# Patient Record
Sex: MALE | Race: OTHER | Employment: OTHER | URBAN - METROPOLITAN AREA
[De-identification: names, ages, dates, MRNs, and addresses within clinical notes are randomized per-mention and may not be internally consistent; named-entity substitution may affect disease eponyms.]

---

## 2020-01-15 ENCOUNTER — HOSPITAL ENCOUNTER (OUTPATIENT)
Age: 85
Discharge: HOME | End: 2020-01-15
Payer: MEDICARE

## 2020-01-15 ENCOUNTER — HOSPITAL ENCOUNTER (OUTPATIENT)
Dept: GENERAL RADIOLOGY | Age: 85
Discharge: HOME | End: 2020-01-15
Payer: MEDICARE

## 2020-01-15 ENCOUNTER — OFFICE VISIT (OUTPATIENT)
Dept: INTERNAL MEDICINE CLINIC | Age: 85
End: 2020-01-15

## 2020-01-15 VITALS
HEART RATE: 80 BPM | TEMPERATURE: 97.8 F | WEIGHT: 109 LBS | DIASTOLIC BLOOD PRESSURE: 60 MMHG | HEIGHT: 60 IN | SYSTOLIC BLOOD PRESSURE: 160 MMHG | BODY MASS INDEX: 21.4 KG/M2

## 2020-01-15 DIAGNOSIS — D64.9 ANEMIA, UNSPECIFIED TYPE: ICD-10-CM

## 2020-01-15 DIAGNOSIS — R06.09 DYSPNEA ON EXERTION: ICD-10-CM

## 2020-01-15 DIAGNOSIS — R79.89 ELEVATED SERUM CREATININE: ICD-10-CM

## 2020-01-15 DIAGNOSIS — I10 BENIGN ESSENTIAL HTN: ICD-10-CM

## 2020-01-15 PROBLEM — R80.9 PROTEINURIA: Status: ACTIVE | Noted: 2020-01-15

## 2020-01-15 PROBLEM — L60.2 ONYCHAUXIS: Status: ACTIVE | Noted: 2020-01-15

## 2020-01-15 PROBLEM — M17.0 OSTEOARTHRITIS OF BOTH KNEES: Status: ACTIVE | Noted: 2020-01-15

## 2020-01-15 LAB
A/G RATIO: 1.1 (ref 1.1–2.2)
ALBUMIN SERPL-MCNC: 3.9 G/DL (ref 3.4–5)
ALP BLD-CCNC: 87 U/L (ref 40–129)
ALT SERPL-CCNC: 7 U/L (ref 10–40)
ANION GAP SERPL CALCULATED.3IONS-SCNC: 13 MMOL/L (ref 3–16)
AST SERPL-CCNC: 20 U/L (ref 15–37)
BASOPHILS ABSOLUTE: 0.1 K/UL (ref 0–0.2)
BASOPHILS RELATIVE PERCENT: 0.9 %
BILIRUB SERPL-MCNC: 0.5 MG/DL (ref 0–1)
BILIRUBIN URINE: NEGATIVE
BLOOD, URINE: NEGATIVE
BUN BLDV-MCNC: 14 MG/DL (ref 7–20)
CALCIUM SERPL-MCNC: 9 MG/DL (ref 8.3–10.6)
CHLORIDE BLD-SCNC: 103 MMOL/L (ref 99–110)
CLARITY: CLEAR
CO2: 22 MMOL/L (ref 21–32)
COLOR: ABNORMAL
CREAT SERPL-MCNC: 0.6 MG/DL (ref 0.8–1.3)
EOSINOPHILS ABSOLUTE: 0.1 K/UL (ref 0–0.6)
EOSINOPHILS RELATIVE PERCENT: 1.1 %
EPITHELIAL CELLS, UA: 2 /HPF (ref 0–5)
FERRITIN: 31.2 NG/ML (ref 30–400)
FOLATE: >20 NG/ML (ref 4.78–24.2)
GFR AFRICAN AMERICAN: >60
GFR NON-AFRICAN AMERICAN: >60
GLOBULIN: 3.6 G/DL
GLUCOSE BLD-MCNC: 88 MG/DL (ref 70–99)
GLUCOSE URINE: NEGATIVE MG/DL
HCT VFR BLD CALC: 33.3 % (ref 40.5–52.5)
HEMOGLOBIN: 10.7 G/DL (ref 13.5–17.5)
HYALINE CASTS: 2 /LPF (ref 0–8)
IRON SATURATION: 11 % (ref 20–50)
IRON: 30 UG/DL (ref 59–158)
KETONES, URINE: NEGATIVE MG/DL
LEUKOCYTE ESTERASE, URINE: NEGATIVE
LYMPHOCYTES ABSOLUTE: 2 K/UL (ref 1–5.1)
LYMPHOCYTES RELATIVE PERCENT: 22.7 %
MCH RBC QN AUTO: 31.1 PG (ref 26–34)
MCHC RBC AUTO-ENTMCNC: 32.2 G/DL (ref 31–36)
MCV RBC AUTO: 96.9 FL (ref 80–100)
MICROSCOPIC EXAMINATION: YES
MONOCYTES ABSOLUTE: 0.5 K/UL (ref 0–1.3)
MONOCYTES RELATIVE PERCENT: 5.3 %
NEUTROPHILS ABSOLUTE: 6.2 K/UL (ref 1.7–7.7)
NEUTROPHILS RELATIVE PERCENT: 70 %
NITRITE, URINE: NEGATIVE
PDW BLD-RTO: 16.4 % (ref 12.4–15.4)
PH UA: 6 (ref 5–8)
PLATELET # BLD: 346 K/UL (ref 135–450)
PMV BLD AUTO: 9.2 FL (ref 5–10.5)
POTASSIUM SERPL-SCNC: 3.7 MMOL/L (ref 3.5–5.1)
PRO-BNP: 750 PG/ML (ref 0–449)
PROTEIN UA: 30 MG/DL
RBC # BLD: 3.44 M/UL (ref 4.2–5.9)
RBC UA: 3 /HPF (ref 0–4)
SODIUM BLD-SCNC: 138 MMOL/L (ref 136–145)
SPECIFIC GRAVITY UA: 1.02 (ref 1–1.03)
TOTAL IRON BINDING CAPACITY: 261 UG/DL (ref 260–445)
TOTAL PROTEIN: 7.5 G/DL (ref 6.4–8.2)
TSH REFLEX: 1.44 UIU/ML (ref 0.27–4.2)
URINE TYPE: ABNORMAL
UROBILINOGEN, URINE: 1 E.U./DL
VITAMIN B-12: >2000 PG/ML (ref 211–911)
WBC # BLD: 8.8 K/UL (ref 4–11)
WBC UA: 2 /HPF (ref 0–5)

## 2020-01-15 PROCEDURE — 93005 ELECTROCARDIOGRAM TRACING: CPT | Performed by: PHYSICIAN ASSISTANT

## 2020-01-15 PROCEDURE — 99204 OFFICE O/P NEW MOD 45 MIN: CPT | Performed by: PHYSICIAN ASSISTANT

## 2020-01-15 PROCEDURE — 71046 X-RAY EXAM CHEST 2 VIEWS: CPT

## 2020-01-15 RX ORDER — AMLODIPINE BESYLATE 10 MG/1
10 TABLET ORAL DAILY
COMMUNITY
End: 2020-01-15 | Stop reason: SDUPTHER

## 2020-01-15 RX ORDER — AMLODIPINE BESYLATE 10 MG/1
10 TABLET ORAL DAILY
Qty: 30 TABLET | Refills: 2 | Status: SHIPPED | OUTPATIENT
Start: 2020-01-15 | End: 2020-03-10

## 2020-01-15 RX ORDER — LEVOCETIRIZINE DIHYDROCHLORIDE 5 MG/1
5 TABLET, FILM COATED ORAL NIGHTLY
COMMUNITY

## 2020-01-15 RX ORDER — DICLOFENAC POTASSIUM 50 MG/1
50 TABLET, FILM COATED ORAL 2 TIMES DAILY WITH MEALS
COMMUNITY
End: 2020-02-11

## 2020-01-15 ASSESSMENT — ENCOUNTER SYMPTOMS
NAUSEA: 0
ABDOMINAL PAIN: 0
CHEST TIGHTNESS: 0
COUGH: 0
DIARRHEA: 0
TROUBLE SWALLOWING: 0
BLOOD IN STOOL: 0
SORE THROAT: 0
VOMITING: 0
SHORTNESS OF BREATH: 1
WHEEZING: 0
CONSTIPATION: 0

## 2020-01-15 NOTE — PROGRESS NOTES
throughout ) present. No wheezing, rhonchi or rales. Abdominal:      General: Abdomen is flat. Bowel sounds are normal. There is no distension. Palpations: Abdomen is soft. There is no hepatomegaly, splenomegaly or mass. Tenderness: There is no tenderness. Hernia: No hernia is present. Comments: Old midline scar noted    Musculoskeletal: Normal range of motion. Left lower leg: No edema. Feet:      Comments: Thick toenails, some dried blood noted around toenails on left foot, he cut them himself last evening. No wounds. Lymphadenopathy:      Cervical: No cervical adenopathy. Skin:     General: Skin is warm and dry. Neurological:      Mental Status: He is alert and oriented to person, place, and time. Psychiatric:         Attention and Perception: Attention normal.         Mood and Affect: Mood normal.         Behavior: Behavior is cooperative. Labs from 10/2019:  WBC 14  Hb 11.5  MCV 92   Crt 1.34  GFR 47  UA 2+ PRO      HDL 25  LDL 60  A1c 5.3%  TSH 1.13  PSA 1.3  Vt D 25.5  Uric acid 6.3      Assessment/Plan     1. Encounter to establish care    2. Benign essential HTN  - refill norvasc 10 mg daily. RTO 1 month   - COMPREHENSIVE METABOLIC PANEL; Future    3. Osteoarthritis of both knees, unspecified osteoarthritis type  - can cont diclofenac 50 mg BID with meals for now     4. Anemia, unspecified type  - unclear chronicity, denies bleeding   - CBC WITH AUTO DIFFERENTIAL; Future  - IRON AND TIBC; Future  - FERRITIN; Future  - VITAMIN B12 & FOLATE; Future    5. Proteinuria, unspecified type  - URINALYSIS WITH MICROSCOPIC    6. Dyspnea on exertion  - CBC WITH AUTO DIFFERENTIAL; Future  - TSH with Reflex; Future  - XR CHEST STANDARD (2 VW); Future  - EKG 12 Lead; Future  - BRAIN NATRIURETIC PEPTIDE (BNP); Future    7. Elevated serum creatinine  - unclear chronicity   - COMPREHENSIVE METABOLIC PANEL; Future    8.  Onychauxis   - should see podiatry for foot nail care, he has cut them himself and caused bleeding. No wounds today     Further w/u pending results of above  RTO 1 month     William Cain PA-C   1/15/2020      Addendum:    Discussed results with dtr, Chrissne Geges. Her father's dyspnea is much better without intervention.   -  Discussed CXR showing changes consistent with pulmonary fibrosis and my recommendation for high resolution CT chest, Matt Davey will discuss with her dad but would like to hold off for now as his symptoms are much better. - Iron def anemia- resume PO iron, stop diclofenac, repeat CBC 1 month. - High Vit B12- stop oral B12 supplementation.       Has appt scheduled next month with Dr. David Weldon for f/u     William Cain PA-C  1/20/2020 12:42 PM

## 2020-01-16 LAB
EKG ATRIAL RATE: 70 BPM
EKG DIAGNOSIS: NORMAL
EKG P AXIS: 41 DEGREES
EKG P-R INTERVAL: 168 MS
EKG Q-T INTERVAL: 420 MS
EKG QRS DURATION: 136 MS
EKG QTC CALCULATION (BAZETT): 453 MS
EKG R AXIS: -25 DEGREES
EKG T AXIS: 14 DEGREES
EKG VENTRICULAR RATE: 70 BPM

## 2020-01-16 PROCEDURE — 93010 ELECTROCARDIOGRAM REPORT: CPT | Performed by: INTERNAL MEDICINE

## 2020-01-17 PROBLEM — R79.89 ELEVATED SERUM CREATININE: Status: RESOLVED | Noted: 2020-01-15 | Resolved: 2020-01-17

## 2020-01-17 PROBLEM — R93.89 ABNORMAL CXR: Status: ACTIVE | Noted: 2020-01-17

## 2020-01-17 PROBLEM — D50.9 IRON DEFICIENCY ANEMIA: Status: ACTIVE | Noted: 2020-01-15

## 2020-02-11 ENCOUNTER — OFFICE VISIT (OUTPATIENT)
Dept: INTERNAL MEDICINE CLINIC | Age: 85
End: 2020-02-11

## 2020-02-11 VITALS
SYSTOLIC BLOOD PRESSURE: 146 MMHG | DIASTOLIC BLOOD PRESSURE: 40 MMHG | BODY MASS INDEX: 21.4 KG/M2 | WEIGHT: 109 LBS | HEIGHT: 60 IN | HEART RATE: 76 BPM

## 2020-02-11 DIAGNOSIS — D50.9 IRON DEFICIENCY ANEMIA, UNSPECIFIED IRON DEFICIENCY ANEMIA TYPE: ICD-10-CM

## 2020-02-11 LAB
BASOPHILS ABSOLUTE: 0.1 K/UL (ref 0–0.2)
BASOPHILS RELATIVE PERCENT: 0.7 %
EOSINOPHILS ABSOLUTE: 0.2 K/UL (ref 0–0.6)
EOSINOPHILS RELATIVE PERCENT: 2.5 %
HCT VFR BLD CALC: 35.7 % (ref 40.5–52.5)
HEMOGLOBIN: 11.6 G/DL (ref 13.5–17.5)
LYMPHOCYTES ABSOLUTE: 2 K/UL (ref 1–5.1)
LYMPHOCYTES RELATIVE PERCENT: 27.5 %
MCH RBC QN AUTO: 30.4 PG (ref 26–34)
MCHC RBC AUTO-ENTMCNC: 32.5 G/DL (ref 31–36)
MCV RBC AUTO: 93.6 FL (ref 80–100)
MONOCYTES ABSOLUTE: 0.3 K/UL (ref 0–1.3)
MONOCYTES RELATIVE PERCENT: 3.9 %
NEUTROPHILS ABSOLUTE: 4.6 K/UL (ref 1.7–7.7)
NEUTROPHILS RELATIVE PERCENT: 65.4 %
PDW BLD-RTO: 16 % (ref 12.4–15.4)
PLATELET # BLD: 413 K/UL (ref 135–450)
PMV BLD AUTO: 8.9 FL (ref 5–10.5)
RBC # BLD: 3.81 M/UL (ref 4.2–5.9)
WBC # BLD: 7.1 K/UL (ref 4–11)

## 2020-02-11 PROCEDURE — G8484 FLU IMMUNIZE NO ADMIN: HCPCS | Performed by: INTERNAL MEDICINE

## 2020-02-11 PROCEDURE — 4040F PNEUMOC VAC/ADMIN/RCVD: CPT | Performed by: INTERNAL MEDICINE

## 2020-02-11 PROCEDURE — 1123F ACP DISCUSS/DSCN MKR DOCD: CPT | Performed by: INTERNAL MEDICINE

## 2020-02-11 PROCEDURE — G8427 DOCREV CUR MEDS BY ELIG CLIN: HCPCS | Performed by: INTERNAL MEDICINE

## 2020-02-11 PROCEDURE — 99214 OFFICE O/P EST MOD 30 MIN: CPT | Performed by: INTERNAL MEDICINE

## 2020-02-11 PROCEDURE — 1036F TOBACCO NON-USER: CPT | Performed by: INTERNAL MEDICINE

## 2020-02-11 PROCEDURE — G8420 CALC BMI NORM PARAMETERS: HCPCS | Performed by: INTERNAL MEDICINE

## 2020-02-11 RX ORDER — TRAMADOL HYDROCHLORIDE 50 MG/1
50 TABLET ORAL EVERY 12 HOURS PRN
Qty: 60 TABLET | Refills: 0 | Status: SHIPPED | OUTPATIENT
Start: 2020-02-11 | End: 2020-03-12

## 2020-02-11 ASSESSMENT — ENCOUNTER SYMPTOMS
DIARRHEA: 0
WHEEZING: 0
ABDOMINAL PAIN: 0
VOMITING: 0
SHORTNESS OF BREATH: 1
NAUSEA: 0
BLOOD IN STOOL: 0
COUGH: 0
CHEST TIGHTNESS: 0

## 2020-02-11 NOTE — PROGRESS NOTES
Subjective:      Patient ID: Garry Mustafa is a 80 y.o. male. HPI  Is here for follow up of HTN, anemia and OA    Patient's BP is  controlled on current medications. He stopped taking NSAID as he was found to be anemic,he has been having in the knee joints. Takes iron pills for anemia. She is not keen on him getting a colonoscopy       Review of Systems   Constitutional: Negative. Negative for fatigue and fever. Respiratory: Positive for shortness of breath (with exertion). Negative for cough, chest tightness and wheezing. Cardiovascular: Negative for chest pain and palpitations. Gastrointestinal: Negative for abdominal pain, blood in stool, diarrhea, nausea and vomiting. Genitourinary: Negative for hematuria. Neurological: Negative for headaches. Objective:   Physical Exam  Constitutional:       Appearance: He is well-developed. HENT:      Head: Normocephalic and atraumatic. Eyes:      Pupils: Pupils are equal, round, and reactive to light. Neck:      Musculoskeletal: Normal range of motion and neck supple. Thyroid: No thyromegaly. Cardiovascular:      Rate and Rhythm: Normal rate and regular rhythm. Heart sounds: Normal heart sounds. No murmur. No friction rub. No gallop. Comments: No carotid bruit  Pulmonary:      Effort: Pulmonary effort is normal. No respiratory distress. Breath sounds: Normal breath sounds. No wheezing or rales. Chest:      Chest wall: No tenderness. Abdominal:      General: Bowel sounds are normal. There is no distension. Palpations: Abdomen is soft. There is no mass. Tenderness: There is no abdominal tenderness. There is no guarding or rebound. Neurological:      Mental Status: He is alert and oriented to person, place, and time. Assessment:       Diagnosis Orders   1. Benign essential HTN     2. Iron deficiency anemia, unspecified iron deficiency anemia type  CBC Auto Differential   3.  Osteoarthritis of both knees, unspecified osteoarthritis type  traMADol (ULTRAM) 50 MG tablet   4. Pulmonary fibrosis ([address]             Plan:        HTN. Fair control. Continue norvasc. Anemia- likely iron deficiency. Takes iron pills. Interstitial lung disease per CXR  Order high res lung CT  He does have SOB with exertion    OA  Avoid NSAIDS given anemia. Tylenol not helping.   Tramadol bid prn          Tracy Pedraza MD

## 2020-02-15 ENCOUNTER — HOSPITAL ENCOUNTER (OUTPATIENT)
Dept: CT IMAGING | Age: 85
Discharge: HOME | End: 2020-02-15
Payer: MEDICARE

## 2020-02-15 PROCEDURE — 71250 CT THORAX DX C-: CPT

## 2020-02-25 ENCOUNTER — TELEPHONE (OUTPATIENT)
Dept: PULMONOLOGY | Age: 85
End: 2020-02-25

## 2020-02-25 ENCOUNTER — TELEPHONE (OUTPATIENT)
Dept: INTERNAL MEDICINE CLINIC | Age: 85
End: 2020-02-25

## 2020-02-25 NOTE — TELEPHONE ENCOUNTER
----- Message from Roverto Mikie Shar sent at 2/25/2020 12:37 PM EST -----  Contact: pt dtr- [phone]  His dtr nereyda called- said he was referred to a pulmonologist- called for an appt. ,but they need a referral sent over- did not know the dr.they were referred to even though she called them?- had no information -said they would not set up an appt until they had the referral - last appt -2-11-20-please let her know at [phone]

## 2020-02-25 NOTE — TELEPHONE ENCOUNTER
----- Message from Robert Simonebhavesh Myles sent at 2/25/2020 12:37 PM EST -----  Contact: pt dtr- [phone]  His dtr nereyda called- said he was referred to a pulmonologist- called for an appt. ,but they need a referral sent over- did not know the dr.they were referred to even though she called them?- had no information -said they would not set up an appt until they had the referral - last appt -2-11-20-please let her know at [phone]

## 2020-02-27 ENCOUNTER — TELEPHONE (OUTPATIENT)
Dept: PULMONOLOGY | Age: 85
End: 2020-02-27

## 2020-02-27 NOTE — TELEPHONE ENCOUNTER
Patient did not show for new patient abnormal CT appointment referred by  with  on 2/27/20    Same Day Cancellation: No    Patient rescheduled:  No    New appointment: n/a    Patient was also no show on: n/a

## 2020-03-05 ENCOUNTER — TELEPHONE (OUTPATIENT)
Dept: PHARMACY | Age: 85
End: 2020-03-05

## 2020-03-09 ENCOUNTER — HOSPITAL ENCOUNTER (OUTPATIENT)
Age: 85
Discharge: HOME | End: 2020-03-09
Payer: MEDICAID

## 2020-03-09 ENCOUNTER — OFFICE VISIT (OUTPATIENT)
Dept: PULMONOLOGY | Age: 85
End: 2020-03-09
Payer: MEDICAID

## 2020-03-09 VITALS
DIASTOLIC BLOOD PRESSURE: 60 MMHG | OXYGEN SATURATION: 95 % | HEART RATE: 71 BPM | WEIGHT: 127 LBS | BODY MASS INDEX: 21.16 KG/M2 | RESPIRATION RATE: 19 BRPM | HEIGHT: 65 IN | SYSTOLIC BLOOD PRESSURE: 138 MMHG

## 2020-03-09 PROCEDURE — 86235 NUCLEAR ANTIGEN ANTIBODY: CPT

## 2020-03-09 PROCEDURE — 86038 ANTINUCLEAR ANTIBODIES: CPT

## 2020-03-09 PROCEDURE — 82085 ASSAY OF ALDOLASE: CPT

## 2020-03-09 PROCEDURE — 36415 COLL VENOUS BLD VENIPUNCTURE: CPT

## 2020-03-09 PROCEDURE — 99204 OFFICE O/P NEW MOD 45 MIN: CPT | Performed by: INTERNAL MEDICINE

## 2020-03-09 PROCEDURE — 86431 RHEUMATOID FACTOR QUANT: CPT

## 2020-03-09 PROCEDURE — G8484 FLU IMMUNIZE NO ADMIN: HCPCS | Performed by: INTERNAL MEDICINE

## 2020-03-09 PROCEDURE — G8427 DOCREV CUR MEDS BY ELIG CLIN: HCPCS | Performed by: INTERNAL MEDICINE

## 2020-03-09 PROCEDURE — 82550 ASSAY OF CK (CPK): CPT

## 2020-03-09 PROCEDURE — G8420 CALC BMI NORM PARAMETERS: HCPCS | Performed by: INTERNAL MEDICINE

## 2020-03-09 PROCEDURE — 86200 CCP ANTIBODY: CPT

## 2020-03-09 NOTE — PROGRESS NOTES
are  considered. Within the right upper lobe there is a nodule favoring an area of nodular  scarring measuring 1.5 x 0.7 cm. It should be treated initially as a nodule  and stability demonstrated with follow-up  The heart is enlarged. Coronary artery calcifications are noted. Thoracic  aortic calcifications are noted. Assessment:       · ILD on CT 2/15/2020 . DDx IPF, hypersensitivity pneumonitis, NSIP, CTD-ILD and eosinophilic pneumonia. · RUL 1.5 x 0.7 cm nodule. DDx inflammatory, granuloma, and malignancy. · Dyspnea -secondary to above  · [place] [employer] for 60 years with no respiratory protection  · Lifelong non-smoker      Plan:       · Order for PFTs and 6MW  · Rheumatoid factor, anti-cyclic citrulinated peptide, antinuclear antibody, antisynthetase antibodies, creatine kinase, aldolase, SSA, SSB, Anti SCL70  · Trial of Symbicort 160/4.5 2 puffs BID   · Trial of Albuterol 2 puffs Q4-6 hrs PRN  · We will consider bronchoscopy  · Options of Bx, resection and watchful waiting were discussed with patient.  I recommend radiographic follow up CT chest in may 2020   · Recommend pneumococcal vaccine and yearly influenza vaccine

## 2020-03-10 LAB
ANTI-JO1 IGG: <0.2 AI (ref 0–0.9)
ANTI-NUCLEAR ANTIBODY (ANA): NEGATIVE
ANTI-SCL70 IGG: <0.2 AI (ref 0–0.9)
ANTI-SS-A IGG: <0.2 AI (ref 0–0.9)
ANTI-SS-B IGG: <0.2 AI (ref 0–0.9)
CYCLIC CITRULLINATED PEPTIDE ANTIBODY IGG: 0.5 U/ML (ref 0–2.9)
RHEUMATOID FACTOR: <10 IU/ML
TOTAL CK: 57 U/L (ref 39–308)

## 2020-03-10 RX ORDER — BUDESONIDE AND FORMOTEROL FUMARATE DIHYDRATE 160; 4.5 UG/1; UG/1
2 AEROSOL RESPIRATORY (INHALATION) 2 TIMES DAILY
Qty: 1 INHALER | Refills: 6 | Status: SHIPPED | OUTPATIENT
Start: 2020-03-10 | End: 2021-03-29

## 2020-03-10 RX ORDER — ALBUTEROL SULFATE 90 UG/1
2 AEROSOL, METERED RESPIRATORY (INHALATION) EVERY 6 HOURS PRN
Qty: 1 INHALER | Refills: 6 | Status: SHIPPED | OUTPATIENT
Start: 2020-03-10 | End: 2021-12-01 | Stop reason: SDUPTHER

## 2020-03-10 RX ORDER — AMLODIPINE BESYLATE 10 MG/1
TABLET ORAL
Qty: 90 TABLET | Refills: 0 | Status: SHIPPED | OUTPATIENT
Start: 2020-03-10 | End: 2020-07-10

## 2020-03-11 LAB — ALDOLASE: 3.2 U/L (ref 1.5–8.1)

## 2020-04-15 ENCOUNTER — TELEPHONE (OUTPATIENT)
Dept: PULMONOLOGY | Age: 85
End: 2020-04-15

## 2020-04-15 NOTE — TELEPHONE ENCOUNTER
Patient has cancelled appointment based on the CDC recommended COVID-19 pandemic precautions. Pt is scheduled for 4 week appt on 4/20/2020. appt is cancelled. Patient called with message left for patient to call back to office. 3/9/2020    Assessment:       · ILD on CT 2/15/2020 . DDx IPF, hypersensitivity pneumonitis, NSIP, CTD-ILD and eosinophilic pneumonia. · RUL 1.5 x 0.7 cm nodule. DDx inflammatory, granuloma, and malignancy. · Dyspnea -secondary to above  · [place] for 60 years with no respiratory protection  · Lifelong non-smoker      Plan:       · Order for PFTs and 6MW  · Rheumatoid factor, anti-cyclic citrulinated peptide, antinuclear antibody, antisynthetase antibodies, creatine kinase, aldolase, SSA, SSB, Anti SCL70  · Trial of Symbicort 160/4.5 2 puffs BID   · Trial of Albuterol 2 puffs Q4-6 hrs PRN  · We will consider bronchoscopy  · Options of Bx, resection and watchful waiting were discussed with patient.  I recommend radiographic follow up CT chest in may 2020   · Recommend pneumococcal vaccine and yearly influenza vaccine

## 2020-05-23 ENCOUNTER — HOSPITAL ENCOUNTER (OUTPATIENT)
Dept: CT IMAGING | Age: 85
Discharge: HOME | End: 2020-05-23
Payer: MEDICARE

## 2020-05-23 PROCEDURE — 71250 CT THORAX DX C-: CPT

## 2020-05-27 ENCOUNTER — TELEPHONE (OUTPATIENT)
Dept: PULMONOLOGY | Age: 85
End: 2020-05-27

## 2020-05-27 NOTE — TELEPHONE ENCOUNTER
Patient has cancelled appointment based on the CDC recommended COVID-19 pandemic precautions. Pt was scheduled for 4 week f/u. Attempted to reach pt to change to VV no response. Will continue to call pt to r/s.    3/9/2020    Assessment:       · ILD on CT 2/15/2020 . DDx IPF, hypersensitivity pneumonitis, NSIP, CTD-ILD and eosinophilic pneumonia. · RUL 1.5 x 0.7 cm nodule. DDx inflammatory, granuloma, and malignancy. · Dyspnea -secondary to above  · [place] [employer] for 60 years with no respiratory protection  · Lifelong non-smoker      Plan:       · Order for PFTs and 6MW  · Rheumatoid factor, anti-cyclic citrulinated peptide, antinuclear antibody, antisynthetase antibodies, creatine kinase, aldolase, SSA, SSB, Anti SCL70  · Trial of Symbicort 160/4.5 2 puffs BID   · Trial of Albuterol 2 puffs Q4-6 hrs PRN  · We will consider bronchoscopy  · Options of Bx, resection and watchful waiting were discussed with patient.  I recommend radiographic follow up CT chest in may 2020   · Recommend pneumococcal vaccine and yearly influenza vaccine

## 2020-06-10 ENCOUNTER — OFFICE VISIT (OUTPATIENT)
Dept: PULMONOLOGY | Age: 85
End: 2020-06-10
Payer: MEDICARE

## 2020-06-10 ENCOUNTER — TELEPHONE (OUTPATIENT)
Dept: PULMONOLOGY | Age: 85
End: 2020-06-10

## 2020-06-10 VITALS
OXYGEN SATURATION: 96 % | RESPIRATION RATE: 20 BRPM | SYSTOLIC BLOOD PRESSURE: 132 MMHG | HEIGHT: 63 IN | HEART RATE: 83 BPM | TEMPERATURE: 98 F | BODY MASS INDEX: 22.5 KG/M2 | DIASTOLIC BLOOD PRESSURE: 66 MMHG

## 2020-06-10 PROCEDURE — 99214 OFFICE O/P EST MOD 30 MIN: CPT | Performed by: INTERNAL MEDICINE

## 2020-06-10 PROCEDURE — G0009 ADMIN PNEUMOCOCCAL VACCINE: HCPCS | Performed by: INTERNAL MEDICINE

## 2020-06-10 PROCEDURE — 4040F PNEUMOC VAC/ADMIN/RCVD: CPT | Performed by: INTERNAL MEDICINE

## 2020-06-10 PROCEDURE — 1036F TOBACCO NON-USER: CPT | Performed by: INTERNAL MEDICINE

## 2020-06-10 PROCEDURE — G8427 DOCREV CUR MEDS BY ELIG CLIN: HCPCS | Performed by: INTERNAL MEDICINE

## 2020-06-10 PROCEDURE — 90732 PPSV23 VACC 2 YRS+ SUBQ/IM: CPT | Performed by: INTERNAL MEDICINE

## 2020-06-10 PROCEDURE — 1123F ACP DISCUSS/DSCN MKR DOCD: CPT | Performed by: INTERNAL MEDICINE

## 2020-06-10 PROCEDURE — G8420 CALC BMI NORM PARAMETERS: HCPCS | Performed by: INTERNAL MEDICINE

## 2020-06-10 NOTE — TELEPHONE ENCOUNTER
Pt needs in office appt after ct chest and pft in 9/1/2020. Watch for in office schedule    6/10/2020    Assessment:       · Pulmonary bronchiectasis  · ILD on CT 2/15/2020 . DDx IPF, hypersensitivity pneumonitis, NSIP, CTD-ILD and eosinophilic pneumonia. Unremarkable connective tissue disease work-up with no peripheral eosinophilia. · RUL 1.5 x 0.7 cm nodule on CT 2/15/2020. Stable on repeat CT 5/23/2020. DDx inflammatory, granuloma, and malignancy. · Dyspnea -secondary to above. Much improved with inhaled bronchodilators  · [employer] for 60 years with no respiratory protection  · Lifelong non-smoker      Plan:       · PFTs and 6MW when able given Covid 19 pandemic   · Repeat CT chest in 8/2020  · Continue Symbicort 160/4.5 2 puffs BID   · Continue Albuterol 2 puffs Q4-6 hrs PRN  · Acapella QID to mobilize respiratory secretions.   · Trial of Spiriva Respimat: Two inhalations (5 mcg) once daily (maximum: 2 inhalations per 24 hours  · Pulmonary rehab referral   · Surveillance bronchoscopy with BAL when Covid 19 restriction are lifted    · Pneumococcal vaccine today   · Advised to get influenza vaccine this year

## 2020-06-10 NOTE — PATIENT INSTRUCTIONS
rarely.  Any medication can cause a severe allergic reaction. Such reactions from a vaccine are very rare, estimated at about 1 in a million doses, and would happen within a few minutes to a few hours after the vaccination. As with any medicine, there is a very remote chance of a vaccine causing a serious injury or death. The safety of vaccines is always being monitored. For more information, visit: www.cdc.gov/vaccinesafety/     5. What if there is a serious reaction? What should I look for? Look for anything that concerns you, such as signs of a severe allergic reaction, very high fever, or unusual behavior. Signs of a severe allergic reaction can include hives, swelling of the face and throat, difficulty breathing, a fast heartbeat, dizziness, and weakness. These would usually start a few minutes to a few hours after the vaccination. What should I do? If you think it is a severe allergic reaction or other emergency that cant wait, call 9-1-1 or get to the nearest hospital. Otherwise, call your doctor. Afterward, the reaction should be reported to the Vaccine Adverse Event Reporting System (VAERS). Your doctor might file this report, or you can do it yourself through the VAERS web site at www.vaers. [facility].gov, or by calling 6-375.248.8978. [employer] does not give medical advice. 6. How can I learn more?  Ask your doctor. He or she can give you the vaccine package insert or suggest other sources of information.  Call your local or state health department.    Contact the Centers for Disease Control and Prevention (CDC):  - Call 0-134.990.6331 (1-800-CDC-INFO) or  - Visit CDCs website at [address] 04/24/2015    [facility] and [facility] for Disease Control and Prevention    Office Use Only

## 2020-06-10 NOTE — PROGRESS NOTES
Shortness of Breath, Disp: 1 Inhaler, Rfl: 6    amLODIPine (NORVASC) 10 MG tablet, Take 1 tablet by mouth once daily, Disp: 90 tablet, Rfl: 0    [id number]-Succ Ac (FERREX 28 PO), Take by mouth, Disp: , Rfl:     levocetirizine (XYZAL) 5 MG tablet, Take 5 mg by mouth nightly , Disp: , Rfl:         Objective:   PHYSICAL EXAM:    Blood pressure 132/66, pulse 83, temperature 98 °F (36.7 °C), temperature source Oral, resp. rate 20, height 5' 3\" (1.6 m), SpO2 96 %.' on RA  Gen: No distress. Ill-appearing   Eyes: PERRL. No sclera icterus. No conjunctival injection. ENT: No discharge. Pharynx clear. Neck: Trachea midline. No obvious mass. Resp: No accessory muscle use. Bilateral crackles. Minimal wheezes. No rhonchi. No dullness on percussion. Good air entry. CV: Regular rate. Regular rhythm. No murmur or rub. No edema. GI: Non-tender. Non-distended. No hernia. Skin: Warm and dry. No nodule on exposed extremities. Lymph: No cervical LAD. No supraclavicular LAD. M/S: No cyanosis. No joint deformity. No clubbing. Neuro: Awake. Alert. Moves all four extremities. Psych: Oriented x 3. No anxiety. DATA reviewed by me:   CT chest 2/15/2020  images reviewed by me and showed: Moderate-to-severe chronic lung disease is noted with mixed features. Areas  of paraseptal emphysema are noted as well as areas of end-stage honeycomb  lung. The honeycomb lung is most severe at the lung bases. UIP is  considered although there is not much ground-glass disease present. Sequela  from a remote infectious or inflammatory insult is considered. Connective  tissue related lung disease and chronic hypersensitivity pneumonitis are  considered. Within the right upper lobe there is a nodule favoring an area of nodular  scarring measuring 1.5 x 0.7 cm. It should be treated initially as a nodule  and stability demonstrated with follow-up  The heart is enlarged.   Coronary artery calcifications are

## 2020-07-10 RX ORDER — AMLODIPINE BESYLATE 10 MG/1
TABLET ORAL
Qty: 90 TABLET | Refills: 0 | Status: SHIPPED | OUTPATIENT
Start: 2020-07-10 | End: 2020-10-05

## 2020-08-25 ENCOUNTER — OFFICE VISIT (OUTPATIENT)
Dept: PRIMARY CARE CLINIC | Age: 85
End: 2020-08-25
Payer: MEDICARE

## 2020-08-25 PROCEDURE — 99211 OFF/OP EST MAY X REQ PHY/QHP: CPT | Performed by: NURSE PRACTITIONER

## 2020-08-25 PROCEDURE — G8420 CALC BMI NORM PARAMETERS: HCPCS | Performed by: NURSE PRACTITIONER

## 2020-08-25 PROCEDURE — G8428 CUR MEDS NOT DOCUMENT: HCPCS | Performed by: NURSE PRACTITIONER

## 2020-08-25 NOTE — PROGRESS NOTES
Hardeep River received a viral test for COVID-19. They were educated on isolation and quarantine as appropriate. For any symptoms, they were directed to seek care from their PCP, given contact information to establish with a doctor, directed to an urgent care or the emergency room.

## 2020-08-25 NOTE — TELEPHONE ENCOUNTER
Yes we can do VV with , but will have to do a walk through before hand to make sure that the process works.

## 2020-08-26 LAB — SARS-COV-2, NAA: NOT DETECTED

## 2020-09-01 ENCOUNTER — HOSPITAL ENCOUNTER (OUTPATIENT)
Dept: CT IMAGING | Age: 85
Discharge: HOME | End: 2020-09-01
Payer: MEDICARE

## 2020-09-01 ENCOUNTER — HOSPITAL ENCOUNTER (OUTPATIENT)
Dept: PULMONOLOGY | Age: 85
Discharge: HOME | End: 2020-09-01
Payer: MEDICARE

## 2020-09-01 PROCEDURE — 6370000000 HC RX 637 (ALT 250 FOR IP): Performed by: INTERNAL MEDICINE

## 2020-09-01 PROCEDURE — 71250 CT THORAX DX C-: CPT

## 2020-09-01 RX ORDER — ALBUTEROL SULFATE 90 UG/1
2 AEROSOL, METERED RESPIRATORY (INHALATION) ONCE
Status: DISCONTINUED | OUTPATIENT
Start: 2020-09-01 | End: 2020-09-02 | Stop reason: HOSPADM

## 2020-09-02 NOTE — PROCEDURES
[address]                 [address] [facility] [place], [address]                               PULMONARY FUNCTION    PATIENT NAME: Amor Morales                  :        1930  MED REC NO:   [id number]                          ROOM:  ACCOUNT NO:   [de-identified]                           ADMIT DATE: 2020  PROVIDER:     Steven Zambrano MD    DATE OF PROCEDURE:  2020    REFERRING PROVIDER:  Gladys Awad MD    INDICATION:  ILD. FINDINGS:  1. Spirometry: The FVC is 93% of predicted. The FEV1 is 1.13 liters,  which is 61% of predicted. The FEV1/FVC ratio is 0.48.  2.  The flow volume loop was nondiagnostic. IMPRESSION:  There is a moderate obstructive defect present. This is  consistent with the diagnosis of moderate COPD. It should be noted that  the technician noted that the patient could not accurately follow the  maneuvers and this result should be interpreted with caution. SIX-MINUTE WALK TEST:  A six-minute walk test was conducted per [facility] protocol. The patient walked 480 feet. There was a  significant desaturation from 95% at rest and 1 liter per minute of  oxygen was required to maintain saturations at 88% or greater. The  heart rate at rest was 72 and the heart rate maximum was 97.         Jason Galvan MD    D: 2020 13:20:19       T: 2020 18:43:22     [id number]  Job#: [id number]     Doc#: [id number]    CC:

## 2020-09-09 NOTE — TELEPHONE ENCOUNTER
Per Dr Roberto Gray pt will need to be seen in office with interp line.  Dr Roberto Gray will let me know when to schedule in the next week

## 2020-09-11 ENCOUNTER — OFFICE VISIT (OUTPATIENT)
Dept: INTERNAL MEDICINE CLINIC | Age: 85
End: 2020-09-11

## 2020-09-11 VITALS
SYSTOLIC BLOOD PRESSURE: 148 MMHG | HEIGHT: 63 IN | BODY MASS INDEX: 21.26 KG/M2 | WEIGHT: 120 LBS | HEART RATE: 80 BPM | DIASTOLIC BLOOD PRESSURE: 62 MMHG

## 2020-09-11 DIAGNOSIS — I10 BENIGN ESSENTIAL HTN: ICD-10-CM

## 2020-09-11 DIAGNOSIS — D50.9 IRON DEFICIENCY ANEMIA, UNSPECIFIED IRON DEFICIENCY ANEMIA TYPE: ICD-10-CM

## 2020-09-11 PROCEDURE — 99214 OFFICE O/P EST MOD 30 MIN: CPT | Performed by: INTERNAL MEDICINE

## 2020-09-11 ASSESSMENT — ENCOUNTER SYMPTOMS
COUGH: 0
VOMITING: 0
CHEST TIGHTNESS: 0
SHORTNESS OF BREATH: 1
BLOOD IN STOOL: 0
WHEEZING: 0
DIARRHEA: 0
NAUSEA: 0
ABDOMINAL PAIN: 0

## 2020-09-11 NOTE — PROGRESS NOTES
Subjective:      Patient ID: Harriett Hook is a 80 y.o. male. HPI  Is here for follow up of HTN, anemia and OA     Patient's BP is  controlled on current medications. He stopped taking NSAID as he was found to be anemic,he has been having in the knee joints. Takes iron pills for anemia. patient's family is not keen on him getting a colonoscopy given his age     Review of Systems   Constitutional: Negative. Negative for fatigue and fever. Respiratory: Positive for shortness of breath. Negative for cough, chest tightness and wheezing. Cardiovascular: Negative for chest pain and palpitations. Gastrointestinal: Negative for abdominal pain, blood in stool, diarrhea, nausea and vomiting. Objective:   Physical Exam  Constitutional:       Appearance: He is well-developed. HENT:      Head: Normocephalic and atraumatic. Eyes:      Pupils: Pupils are equal, round, and reactive to light. Neck:      Musculoskeletal: Normal range of motion and neck supple. Thyroid: No thyromegaly. Cardiovascular:      Rate and Rhythm: Normal rate and regular rhythm. Heart sounds: Normal heart sounds. No murmur. No friction rub. No gallop. Comments: No carotid bruit  Pulmonary:      Effort: Pulmonary effort is normal. No respiratory distress. Breath sounds: Normal breath sounds. No wheezing or rales. Chest:      Chest wall: No tenderness. Abdominal:      General: Bowel sounds are normal. There is no distension. Palpations: Abdomen is soft. There is no mass. Tenderness: There is no abdominal tenderness. There is no guarding or rebound. Neurological:      Mental Status: He is alert and oriented to person, place, and time. Assessment:       Diagnosis Orders   1. Benign essential HTN  Basic Metabolic Panel    CBC Auto Differential   2. Iron deficiency anemia, unspecified iron deficiency anemia type     3. Osteoarthritis of both knees, unspecified osteoarthritis type     4. Need for influenza vaccination             Plan:      HTN. Fair control. Continue norvasc.     Anemia- likely iron deficiency. Takes iron pills. repeat CBC      bronchiectasis  Interstitial lung disease per CXR  He does have SOB with exertion  See pulmonologist  continue inhalers     OA  Avoid NSAIDS given anemia. Tylenol not helping.   Tramadol at night as it makes him drowsy during the day  Dilcofenac gel to Ely Rodriges MD

## 2020-09-12 LAB
ANION GAP SERPL CALCULATED.3IONS-SCNC: 13 MMOL/L (ref 3–16)
BASOPHILS ABSOLUTE: 0.1 K/UL (ref 0–0.2)
BASOPHILS RELATIVE PERCENT: 0.7 %
BUN BLDV-MCNC: 20 MG/DL (ref 7–20)
CALCIUM SERPL-MCNC: 9.4 MG/DL (ref 8.3–10.6)
CHLORIDE BLD-SCNC: 108 MMOL/L (ref 99–110)
CO2: 22 MMOL/L (ref 21–32)
CREAT SERPL-MCNC: 0.8 MG/DL (ref 0.8–1.3)
EOSINOPHILS ABSOLUTE: 0.2 K/UL (ref 0–0.6)
EOSINOPHILS RELATIVE PERCENT: 2.1 %
GFR AFRICAN AMERICAN: >60
GFR NON-AFRICAN AMERICAN: >60
GLUCOSE BLD-MCNC: 65 MG/DL (ref 70–99)
HCT VFR BLD CALC: 41.1 % (ref 40.5–52.5)
HEMOGLOBIN: 13.4 G/DL (ref 13.5–17.5)
LYMPHOCYTES ABSOLUTE: 3 K/UL (ref 1–5.1)
LYMPHOCYTES RELATIVE PERCENT: 33 %
MCH RBC QN AUTO: 31.4 PG (ref 26–34)
MCHC RBC AUTO-ENTMCNC: 32.7 G/DL (ref 31–36)
MCV RBC AUTO: 96.2 FL (ref 80–100)
MONOCYTES ABSOLUTE: 0.6 K/UL (ref 0–1.3)
MONOCYTES RELATIVE PERCENT: 6.1 %
NEUTROPHILS ABSOLUTE: 5.2 K/UL (ref 1.7–7.7)
NEUTROPHILS RELATIVE PERCENT: 58.1 %
PDW BLD-RTO: 15.9 % (ref 12.4–15.4)
PLATELET # BLD: 376 K/UL (ref 135–450)
PMV BLD AUTO: 9.6 FL (ref 5–10.5)
POTASSIUM SERPL-SCNC: 4.2 MMOL/L (ref 3.5–5.1)
RBC # BLD: 4.27 M/UL (ref 4.2–5.9)
SODIUM BLD-SCNC: 143 MMOL/L (ref 136–145)
WBC # BLD: 9 K/UL (ref 4–11)

## 2020-09-17 ENCOUNTER — TELEPHONE (OUTPATIENT)
Dept: INTERNAL MEDICINE CLINIC | Age: 85
End: 2020-09-17

## 2020-09-17 NOTE — TELEPHONE ENCOUNTER
----- Message from Salazar Dewitt MD sent at 9/17/2020  4:47 PM EDT -----  No   Take claritin   ----- Message -----  From: Kayode Holloway  Sent: 9/17/2020   3:54 PM EDT  To: Salazar Dewitt MD    Pt's daughter states that you had pt stop taking a medication, she did not know the name of it, and states pt now has a dry cough and runny nose. She wants to know if him stopping the medication could cause this. Please advise. Last appt. 9/11/20. Next appt. 12/16/20.

## 2020-09-18 PROCEDURE — 90471 IMMUNIZATION ADMIN: CPT | Performed by: INTERNAL MEDICINE

## 2020-09-18 PROCEDURE — 90662 IIV NO PRSV INCREASED AG IM: CPT | Performed by: INTERNAL MEDICINE

## 2020-09-24 NOTE — TELEPHONE ENCOUNTER
Left message for pts daughter Alize Evans asking her to return call to office. Pt needs to be made aware of appt and also needs to be set up with O2.  Please see result note

## 2020-09-28 ENCOUNTER — TELEPHONE (OUTPATIENT)
Dept: PULMONOLOGY | Age: 85
End: 2020-09-28

## 2020-10-05 RX ORDER — AMLODIPINE BESYLATE 10 MG/1
TABLET ORAL
Qty: 90 TABLET | Refills: 0 | Status: SHIPPED | OUTPATIENT
Start: 2020-10-05 | End: 2021-01-04

## 2021-01-04 RX ORDER — AMLODIPINE BESYLATE 10 MG/1
TABLET ORAL
Qty: 90 TABLET | Refills: 0 | Status: SHIPPED | OUTPATIENT
Start: 2021-01-04 | End: 2021-03-29

## 2021-01-14 ENCOUNTER — VIRTUAL VISIT (OUTPATIENT)
Dept: INTERNAL MEDICINE CLINIC | Age: 86
End: 2021-01-14

## 2021-01-14 DIAGNOSIS — I10 BENIGN ESSENTIAL HTN: Primary | ICD-10-CM

## 2021-01-14 DIAGNOSIS — J47.9 BRONCHIECTASIS WITHOUT COMPLICATION (HCC): ICD-10-CM

## 2021-01-14 DIAGNOSIS — D50.9 IRON DEFICIENCY ANEMIA, UNSPECIFIED IRON DEFICIENCY ANEMIA TYPE: ICD-10-CM

## 2021-01-14 DIAGNOSIS — M17.0 OSTEOARTHRITIS OF BOTH KNEES, UNSPECIFIED OSTEOARTHRITIS TYPE: ICD-10-CM

## 2021-01-14 PROCEDURE — 4040F PNEUMOC VAC/ADMIN/RCVD: CPT | Performed by: INTERNAL MEDICINE

## 2021-01-14 PROCEDURE — G8427 DOCREV CUR MEDS BY ELIG CLIN: HCPCS | Performed by: INTERNAL MEDICINE

## 2021-01-14 PROCEDURE — 1123F ACP DISCUSS/DSCN MKR DOCD: CPT | Performed by: INTERNAL MEDICINE

## 2021-01-14 PROCEDURE — 99213 OFFICE O/P EST LOW 20 MIN: CPT | Performed by: INTERNAL MEDICINE

## 2021-01-14 PROCEDURE — 1036F TOBACCO NON-USER: CPT | Performed by: INTERNAL MEDICINE

## 2021-01-14 PROCEDURE — G8484 FLU IMMUNIZE NO ADMIN: HCPCS | Performed by: INTERNAL MEDICINE

## 2021-01-14 PROCEDURE — G8420 CALC BMI NORM PARAMETERS: HCPCS | Performed by: INTERNAL MEDICINE

## 2021-01-14 ASSESSMENT — ENCOUNTER SYMPTOMS
ABDOMINAL PAIN: 0
WHEEZING: 0
VOMITING: 0
NAUSEA: 0
SHORTNESS OF BREATH: 0
COUGH: 0
BLOOD IN STOOL: 0
DIARRHEA: 0
CHEST TIGHTNESS: 0

## 2021-01-14 NOTE — PROGRESS NOTES
Lit Wilson (:  1930) is a 80 y.o. male,Established patient, here for evaluation of the following chief complaint(s): Follow-up      ASSESSMENT/PLAN:   Diagnosis Orders   1. Benign essential HTN     2. Iron deficiency anemia, unspecified iron deficiency anemia type     3. Osteoarthritis of both knees, unspecified osteoarthritis type     4. Bronchiectasis without complication (HCC)           HTN. Fair control. Continue norvasc.     Anemia- likely iron deficiency. Takes iron pills. Resolved      bronchiectasis  Interstitial lung disease per CXR  He does have SOB with exertion  See pulmonologist  continue inhalers     OA  Tylenol not helping. Dilcofenac gel to knees    SUBJECTIVE/OBJECTIVE:  HPI    Is here for follow up of HTN, anemia and OA     Patient's BP is  controlled on current medications. He stopped taking NSAID as he was found to be anemic,he has been having in the knee joints. diclofenac gel helps   Takes iron pills for anemia. patient's family is not keen on him getting a colonoscopy given his age     Sees pulmonologist for bronchiectasis    Review of Systems   Constitutional: Negative. Negative for fatigue and fever. Respiratory: Negative for cough, chest tightness, shortness of breath and wheezing. Cardiovascular: Negative for chest pain and palpitations. Gastrointestinal: Negative for abdominal pain, blood in stool, diarrhea, nausea and vomiting.          Physical Exam    BP-152/ 61 HR- 67    Constitutional: [x] Appears well-developed and well-nourished [x] No apparent distress      [] Abnormal -     Mental status: [x] Alert and awake  [x] Oriented to person/place/time [x] Able to follow commands    [] Abnormal -         Pulmonary/Chest: [x] Respiratory effort normal   [x] No visualized signs of difficulty breathing or respiratory distress        [] Abnormal -               Psychiatric:       [x] Normal Affect [] Abnormal -        [x] No Hallucinations Other pertinent observable physical exam findings:-                  Cecilia Cao is a [address] y.o. male being evaluated by a Virtual Visit (video visit) encounter to address concerns as mentioned above. A caregiver was present when appropriate. Due to this being a TeleHealth encounter (During [facility] public health emergency), evaluation of the following organ systems was limited: Vitals/Constitutional/EENT/Resp/CV/GI//MS/Neuro/Skin/Heme-Lymph-Imm. Pursuant to the emergency declaration under the [address] [place] [address] authority and the [employer] Act, this Virtual Visit was conducted with patient's (and/or legal guardian's) consent, to reduce the patient's risk of exposure to COVID-19 and provide necessary medical care. The patient (and/or legal guardian) has also been advised to contact this office for worsening conditions or problems, and seek emergency medical treatment and/or call 911 if deemed necessary. Patient identification was verified at the start of the visit: Yes    Services were provided through a video synchronous discussion virtually to substitute for in-person clinic visit. Patient was located at home and provider was located in office or at home. An electronic signature was used to authenticate this note.     --Magali Liz MD

## 2021-02-16 NOTE — TELEPHONE ENCOUNTER
Roberto Calloway returned call and was informed of scheduled appt, requested later time, appt was moved to 10:45 am same day.

## 2021-03-29 RX ORDER — BUDESONIDE AND FORMOTEROL FUMARATE DIHYDRATE 160; 4.5 UG/1; UG/1
AEROSOL RESPIRATORY (INHALATION)
Qty: 1 INHALER | Refills: 5 | Status: SHIPPED | OUTPATIENT
Start: 2021-03-29 | End: 2021-12-01 | Stop reason: SDUPTHER

## 2021-03-29 RX ORDER — AMLODIPINE BESYLATE 10 MG/1
TABLET ORAL
Qty: 90 TABLET | Refills: 0 | Status: SHIPPED | OUTPATIENT
Start: 2021-03-29 | End: 2021-06-28

## 2021-05-20 ENCOUNTER — OFFICE VISIT (OUTPATIENT)
Dept: INTERNAL MEDICINE CLINIC | Age: 86
End: 2021-05-20

## 2021-05-20 VITALS
HEIGHT: 63 IN | DIASTOLIC BLOOD PRESSURE: 52 MMHG | BODY MASS INDEX: 20.55 KG/M2 | WEIGHT: 116 LBS | SYSTOLIC BLOOD PRESSURE: 130 MMHG | HEART RATE: 72 BPM

## 2021-05-20 DIAGNOSIS — I10 BENIGN ESSENTIAL HTN: Primary | ICD-10-CM

## 2021-05-20 DIAGNOSIS — M17.0 OSTEOARTHRITIS OF BOTH KNEES, UNSPECIFIED OSTEOARTHRITIS TYPE: ICD-10-CM

## 2021-05-20 PROCEDURE — 99213 OFFICE O/P EST LOW 20 MIN: CPT | Performed by: INTERNAL MEDICINE

## 2021-05-20 PROCEDURE — 1123F ACP DISCUSS/DSCN MKR DOCD: CPT | Performed by: INTERNAL MEDICINE

## 2021-05-20 PROCEDURE — 1036F TOBACCO NON-USER: CPT | Performed by: INTERNAL MEDICINE

## 2021-05-20 PROCEDURE — G8427 DOCREV CUR MEDS BY ELIG CLIN: HCPCS | Performed by: INTERNAL MEDICINE

## 2021-05-20 PROCEDURE — 4040F PNEUMOC VAC/ADMIN/RCVD: CPT | Performed by: INTERNAL MEDICINE

## 2021-05-20 PROCEDURE — G8420 CALC BMI NORM PARAMETERS: HCPCS | Performed by: INTERNAL MEDICINE

## 2021-05-20 ASSESSMENT — ENCOUNTER SYMPTOMS
COUGH: 0
CHEST TIGHTNESS: 0
NAUSEA: 0
DIARRHEA: 0
VOMITING: 0
SHORTNESS OF BREATH: 0
ABDOMINAL PAIN: 0
WHEEZING: 0
BLOOD IN STOOL: 0

## 2021-05-20 ASSESSMENT — PATIENT HEALTH QUESTIONNAIRE - PHQ9
SUM OF ALL RESPONSES TO PHQ QUESTIONS 1-9: 0
SUM OF ALL RESPONSES TO PHQ9 QUESTIONS 1 & 2: 0
2. FEELING DOWN, DEPRESSED OR HOPELESS: 0
SUM OF ALL RESPONSES TO PHQ QUESTIONS 1-9: 0

## 2021-05-20 NOTE — PROGRESS NOTES
Kar Crow (:  1930) is a 80 y.o. male,Established patient, here for evaluation of the following chief complaint(s):  No chief complaint on file. ASSESSMENT/PLAN:   Diagnosis Orders   1. Benign essential HTN     2. Osteoarthritis of both knees, unspecified osteoarthritis type            HTN. Good control  Continue norvasc.     Anemia- likely iron deficiency. Takes iron pills. Resolved      bronchiectasis  Interstitial lung disease per CXR  He does have SOB with exertion  See pulmonologist  continue inhalers     OA  Dilcofenac gel to knees is helping     Subjective   SUBJECTIVE/OBJECTIVE:  HPI  Is here for follow up of HTN, anemia and OA     Patient's BP is  controlled on current medications. He stopped taking NSAID as he was found to be anemic,he has been having in the knee joints. diclofenac gel helps   Takes iron pills for anemia.   patient's family is not keen on him getting a colonoscopy given his age      Sees pulmonologist for bronchiectasis    Review of Systems   Constitutional: Negative. Negative for fatigue and fever. Respiratory: Negative for cough, chest tightness, shortness of breath and wheezing. Cardiovascular: Negative for chest pain and palpitations. Gastrointestinal: Negative for abdominal pain, blood in stool, diarrhea, nausea and vomiting. Objective   Physical Exam  Constitutional:       Appearance: He is well-developed. HENT:      Head: Normocephalic and atraumatic. Eyes:      Pupils: Pupils are equal, round, and reactive to light. Neck:      Thyroid: No thyromegaly. Cardiovascular:      Rate and Rhythm: Normal rate and regular rhythm. Heart sounds: Normal heart sounds. No murmur heard. No friction rub. No gallop. Comments: No carotid bruit  Pulmonary:      Effort: Pulmonary effort is normal. No respiratory distress. Breath sounds: Normal breath sounds. No wheezing or rales. Chest:      Chest wall: No tenderness. Musculoskeletal:      Cervical back: Normal range of motion and neck supple. Neurological:      Mental Status: He is alert and oriented to person, place, and time. An electronic signature was used to authenticate this note.     --Joselin Schneider MD

## 2021-05-27 ENCOUNTER — OFFICE VISIT (OUTPATIENT)
Dept: PULMONOLOGY | Age: 86
End: 2021-05-27
Payer: MEDICARE

## 2021-05-27 VITALS
OXYGEN SATURATION: 97 % | HEART RATE: 73 BPM | HEIGHT: 60 IN | DIASTOLIC BLOOD PRESSURE: 70 MMHG | SYSTOLIC BLOOD PRESSURE: 136 MMHG | WEIGHT: 116 LBS | BODY MASS INDEX: 22.78 KG/M2

## 2021-05-27 DIAGNOSIS — J44.9 COPD, SEVERE (HCC): Primary | ICD-10-CM

## 2021-05-27 DIAGNOSIS — R06.00 DYSPNEA, UNSPECIFIED TYPE: ICD-10-CM

## 2021-05-27 PROCEDURE — 1036F TOBACCO NON-USER: CPT | Performed by: INTERNAL MEDICINE

## 2021-05-27 PROCEDURE — 99214 OFFICE O/P EST MOD 30 MIN: CPT | Performed by: INTERNAL MEDICINE

## 2021-05-27 PROCEDURE — 1123F ACP DISCUSS/DSCN MKR DOCD: CPT | Performed by: INTERNAL MEDICINE

## 2021-05-27 PROCEDURE — 3023F SPIROM DOC REV: CPT | Performed by: INTERNAL MEDICINE

## 2021-05-27 PROCEDURE — G8427 DOCREV CUR MEDS BY ELIG CLIN: HCPCS | Performed by: INTERNAL MEDICINE

## 2021-05-27 PROCEDURE — G8420 CALC BMI NORM PARAMETERS: HCPCS | Performed by: INTERNAL MEDICINE

## 2021-05-27 PROCEDURE — 4040F PNEUMOC VAC/ADMIN/RCVD: CPT | Performed by: INTERNAL MEDICINE

## 2021-05-27 PROCEDURE — G8926 SPIRO NO PERF OR DOC: HCPCS | Performed by: INTERNAL MEDICINE

## 2021-05-27 NOTE — PROGRESS NOTES
[facility]                                                                    CHIEF COMPLAINT: follow up CT/PFTs      HPI:   Doing much better  No cough or sputum  No hemoptysis   Uses Symbicort BID   Uses Albuterol every other day   No hemoptysis       H&P was limited, patient does not speak English and interview/exam done with the assistance of  over the phone. From prior visit:   81 yo with HTN had CT chest 2/15/2020 to evaluate abnormal  CXR which was done to evaluate shortness of breath. CT chest done 2/15/2020 reviewed by me and showed moderate ILD with DIPIKA small nodule. SOB for the past 5 months. Moderate to severe. Dyspnea worse with exertion and better with resting. No associated cough. No sputum. No hemoptysis. Able to walk 1/2 block. No IBD or O2 at home. No smoking. 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 No pigeons, parakeets or other birds. H&P was limited, patient does not speak English and interview/exam done with the assistance of  over the phone. Past Medical History:   Diagnosis Date    Anemia 10/2019    Hypertension     Osteoarthritis        Past Surgical History:    History reviewed. No pertinent surgical history. Allergies:  has No Known Allergies. Social History:    TOBACCO:   reports that he has never smoked. He has never used smokeless tobacco.  ETOH:   reports previous alcohol use.       Family History:   No lung cancer       Current Medications:    Current Outpatient Medications:     amLODIPine (NORVASC) 10 MG tablet, Take 1 tablet by mouth once daily, Disp: 90 tablet, Rfl: 0    SYMBICORT 160-4.5 MCG/ACT AERO, Inhale 2 puffs by mouth twice daily, Disp: 1 Inhaler, Rfl: 5    tiotropium (SPIRIVA RESPIMAT) 2.5 MCG/ACT AERS inhaler, Inhale 2 puffs into the lungs daily, Disp: 1 Inhaler, Rfl: 5    diclofenac sodium (VOLTAREN) 1 % GEL, Apply 4 g topically 4 times daily, with follow-up  The heart is enlarged. Coronary artery calcifications are noted. Thoracic  aortic calcifications are noted. CT chest 5/23/2020 imaging reviewed by me and showed  No acute intra-thoracic abnormalities  Chronic paraseptal emphysema with honeycombing and bronchiectasis in the lower lobe  Persistent RUL 9 mm nodule  Mild cardiomegaly  Coronary artery atherosclerosis    CT chest 9/11/2020  images reviewed by me and showed:   Mediastinum: Thyroid gland appears normal.  Small mediastinal and hilar nodes are noted. Coronary artery calcification  is seen. Small hiatal hernia seen. There is nonspecific thickening at the  GE junction. Lungs/pleura: Subpleural reticular opacities are seen throughout the lungs  bilaterally. Scattered areas of subpleural honeycombing are seen, greatest  at the lung bases. Focal nodular density in the right upper lobe is redemonstrated. IMPRESSION:   Stable chest CT. No obvious change in pulmonary fibrosis and dominant  irregular nodule in the right upper. Connective tissue disease work-up 3/9/2020  Negative/normal RF CCP BLAKE CK aldolase anti-SCL anti-SSA/SSB       Assessment:       · Severe COPD   · Pulmonary bronchiectasis  · Hypoxia on exertion   · ILD on CT 2/15/2020 . DDx IPF, hypersensitivity pneumonitis, NSIP, CTD-ILD and eosinophilic pneumonia. Unremarkable connective tissue disease work-up with no peripheral eosinophilia. Not candidate for further evaluation/management given age and comorbid conditions. Supportive care is main course of treatment  · RUL 1.5 x 0.7 cm nodule on CT 2/15/2020. Stable on repeat CT 9/11/21. DDx inflammatory, granuloma, and malignancy. Patient/family not interested in further follow-up. Not interested in cancer therapy if he is to have lung cancer. · Dyspnea -secondary to above.   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 Advised to titrate O2 using her pulse oximeter- target O2 sat 90-92%. · ONPO  · Acapella QID to mobilize respiratory secretions. · Pulmonary rehab referral   · Patient is up to date with Pneumococcal vaccine and influenza vaccine.     · Follow-up in 6 months

## 2021-06-28 RX ORDER — AMLODIPINE BESYLATE 10 MG/1
TABLET ORAL
Qty: 90 TABLET | Refills: 0 | Status: SHIPPED | OUTPATIENT
Start: 2021-06-28 | End: 2021-09-28

## 2021-09-28 RX ORDER — AMLODIPINE BESYLATE 10 MG/1
TABLET ORAL
Qty: 90 TABLET | Refills: 0 | Status: SHIPPED | OUTPATIENT
Start: 2021-09-28 | End: 2021-12-13

## 2021-11-24 ENCOUNTER — OFFICE VISIT (OUTPATIENT)
Dept: INTERNAL MEDICINE CLINIC | Age: 86
End: 2021-11-24

## 2021-11-24 VITALS
BODY MASS INDEX: 21.79 KG/M2 | WEIGHT: 111 LBS | DIASTOLIC BLOOD PRESSURE: 50 MMHG | SYSTOLIC BLOOD PRESSURE: 132 MMHG | HEART RATE: 68 BPM | HEIGHT: 60 IN

## 2021-11-24 DIAGNOSIS — J44.9 CHRONIC OBSTRUCTIVE PULMONARY DISEASE, UNSPECIFIED COPD TYPE (HCC): ICD-10-CM

## 2021-11-24 DIAGNOSIS — D50.9 IRON DEFICIENCY ANEMIA, UNSPECIFIED IRON DEFICIENCY ANEMIA TYPE: ICD-10-CM

## 2021-11-24 DIAGNOSIS — I10 BENIGN ESSENTIAL HTN: Primary | ICD-10-CM

## 2021-11-24 DIAGNOSIS — M17.0 OSTEOARTHRITIS OF BOTH KNEES, UNSPECIFIED OSTEOARTHRITIS TYPE: ICD-10-CM

## 2021-11-24 DIAGNOSIS — I10 BENIGN ESSENTIAL HTN: ICD-10-CM

## 2021-11-24 PROCEDURE — 1036F TOBACCO NON-USER: CPT | Performed by: INTERNAL MEDICINE

## 2021-11-24 PROCEDURE — G8420 CALC BMI NORM PARAMETERS: HCPCS | Performed by: INTERNAL MEDICINE

## 2021-11-24 PROCEDURE — 3023F SPIROM DOC REV: CPT | Performed by: INTERNAL MEDICINE

## 2021-11-24 PROCEDURE — G8484 FLU IMMUNIZE NO ADMIN: HCPCS | Performed by: INTERNAL MEDICINE

## 2021-11-24 PROCEDURE — G8427 DOCREV CUR MEDS BY ELIG CLIN: HCPCS | Performed by: INTERNAL MEDICINE

## 2021-11-24 PROCEDURE — 1123F ACP DISCUSS/DSCN MKR DOCD: CPT | Performed by: INTERNAL MEDICINE

## 2021-11-24 PROCEDURE — 4040F PNEUMOC VAC/ADMIN/RCVD: CPT | Performed by: INTERNAL MEDICINE

## 2021-11-24 PROCEDURE — G8926 SPIRO NO PERF OR DOC: HCPCS | Performed by: INTERNAL MEDICINE

## 2021-11-24 PROCEDURE — 99213 OFFICE O/P EST LOW 20 MIN: CPT | Performed by: INTERNAL MEDICINE

## 2021-11-24 ASSESSMENT — ENCOUNTER SYMPTOMS
WHEEZING: 0
COUGH: 0
DIARRHEA: 0
SHORTNESS OF BREATH: 0
BLOOD IN STOOL: 0
NAUSEA: 0
ABDOMINAL PAIN: 0
CHEST TIGHTNESS: 0
VOMITING: 0

## 2021-11-24 NOTE — PROGRESS NOTES
Linda Daugherty (:  1930) is a 80 y.o. male,Established patient, here for evaluation of the following chief complaint(s):  Follow-up         ASSESSMENT/PLAN:     Diagnosis Orders   1. Benign essential HTN  Basic Metabolic Panel    CBC Auto Differential   2. Iron deficiency anemia, unspecified iron deficiency anemia type     3. Chronic obstructive pulmonary disease, unspecified COPD type ([address]     4. Osteoarthritis of both knees, unspecified osteoarthritis type           HTN. Good control  Continue norvasc.     Anemia- likely iron deficiency. Takes iron pills. Resolved      bronchiectasis  Interstitial lung disease per CXR  He does have SOB with exertion  See pulmonologist  continue inhalers     OA  Dilcofenac gel to knees is helping     Mild GRADUAL WEIGHT LOSS  WILL FOLLOW   ENSURE BETWEEN MEALS    Subjective   SUBJECTIVE/OBJECTIVE:  HPI  Is here for follow up of HTN, anemia and OA     Patient's BP is  controlled on current medications. He stopped taking NSAID as he was found to be anemic,he has been having in the knee joints. diclofenac gel helps   Takes iron pills for anemia.   patient's family is not keen on him getting a colonoscopy given his age      Sees pulmonologist for bronchiectasis    Review of Systems   Constitutional: Negative. Negative for fatigue and fever. Respiratory: Negative for cough, chest tightness, shortness of breath and wheezing. Cardiovascular: Negative for chest pain and palpitations. Gastrointestinal: Negative for abdominal pain, blood in stool, diarrhea, nausea and vomiting. Genitourinary: Negative for dysuria and hematuria. Neurological: Negative for light-headedness and headaches. Objective   Physical Exam  Constitutional:       Appearance: He is well-developed. HENT:      Head: Normocephalic and atraumatic. Eyes:      Pupils: Pupils are equal, round, and reactive to light. Neck:      Thyroid: No thyromegaly.    Cardiovascular:      Rate and

## 2021-11-25 LAB
ANION GAP SERPL CALCULATED.3IONS-SCNC: 14 MMOL/L (ref 3–16)
BASOPHILS ABSOLUTE: 0.1 K/UL (ref 0–0.2)
BASOPHILS RELATIVE PERCENT: 0.7 %
BUN BLDV-MCNC: 22 MG/DL (ref 7–20)
CALCIUM SERPL-MCNC: 8.8 MG/DL (ref 8.3–10.6)
CHLORIDE BLD-SCNC: 103 MMOL/L (ref 99–110)
CO2: 24 MMOL/L (ref 21–32)
CREAT SERPL-MCNC: 1.1 MG/DL (ref 0.8–1.3)
EOSINOPHILS ABSOLUTE: 0.1 K/UL (ref 0–0.6)
EOSINOPHILS RELATIVE PERCENT: 1.5 %
GFR AFRICAN AMERICAN: >60
GFR NON-AFRICAN AMERICAN: >60
GLUCOSE BLD-MCNC: 91 MG/DL (ref 70–99)
HCT VFR BLD CALC: 37.6 % (ref 40.5–52.5)
HEMOGLOBIN: 12.2 G/DL (ref 13.5–17.5)
LYMPHOCYTES ABSOLUTE: 2 K/UL (ref 1–5.1)
LYMPHOCYTES RELATIVE PERCENT: 24.1 %
MCH RBC QN AUTO: 30.6 PG (ref 26–34)
MCHC RBC AUTO-ENTMCNC: 32.3 G/DL (ref 31–36)
MCV RBC AUTO: 94.8 FL (ref 80–100)
MONOCYTES ABSOLUTE: 0.3 K/UL (ref 0–1.3)
MONOCYTES RELATIVE PERCENT: 3.8 %
NEUTROPHILS ABSOLUTE: 5.9 K/UL (ref 1.7–7.7)
NEUTROPHILS RELATIVE PERCENT: 69.9 %
PDW BLD-RTO: 16.8 % (ref 12.4–15.4)
PLATELET # BLD: 317 K/UL (ref 135–450)
PMV BLD AUTO: 9.3 FL (ref 5–10.5)
POTASSIUM SERPL-SCNC: 4.5 MMOL/L (ref 3.5–5.1)
RBC # BLD: 3.97 M/UL (ref 4.2–5.9)
SODIUM BLD-SCNC: 141 MMOL/L (ref 136–145)
WBC # BLD: 8.4 K/UL (ref 4–11)

## 2021-12-01 ENCOUNTER — OFFICE VISIT (OUTPATIENT)
Dept: PULMONOLOGY | Age: 86
End: 2021-12-01
Payer: MEDICARE

## 2021-12-01 VITALS
DIASTOLIC BLOOD PRESSURE: 62 MMHG | HEART RATE: 82 BPM | HEIGHT: 60 IN | OXYGEN SATURATION: 92 % | RESPIRATION RATE: 16 BRPM | BODY MASS INDEX: 21.6 KG/M2 | WEIGHT: 110 LBS | SYSTOLIC BLOOD PRESSURE: 130 MMHG

## 2021-12-01 DIAGNOSIS — J84.9 ILD (INTERSTITIAL LUNG DISEASE) (HCC): ICD-10-CM

## 2021-12-01 DIAGNOSIS — J47.9 BRONCHIECTASIS WITHOUT COMPLICATION (HCC): ICD-10-CM

## 2021-12-01 DIAGNOSIS — R91.1 PULMONARY NODULE: ICD-10-CM

## 2021-12-01 DIAGNOSIS — J44.9 COPD, SEVERE (HCC): Primary | ICD-10-CM

## 2021-12-01 DIAGNOSIS — R06.09 DYSPNEA ON EXERTION: ICD-10-CM

## 2021-12-01 PROCEDURE — 1123F ACP DISCUSS/DSCN MKR DOCD: CPT | Performed by: INTERNAL MEDICINE

## 2021-12-01 PROCEDURE — G8926 SPIRO NO PERF OR DOC: HCPCS | Performed by: INTERNAL MEDICINE

## 2021-12-01 PROCEDURE — G8484 FLU IMMUNIZE NO ADMIN: HCPCS | Performed by: INTERNAL MEDICINE

## 2021-12-01 PROCEDURE — 4040F PNEUMOC VAC/ADMIN/RCVD: CPT | Performed by: INTERNAL MEDICINE

## 2021-12-01 PROCEDURE — G8427 DOCREV CUR MEDS BY ELIG CLIN: HCPCS | Performed by: INTERNAL MEDICINE

## 2021-12-01 PROCEDURE — G8420 CALC BMI NORM PARAMETERS: HCPCS | Performed by: INTERNAL MEDICINE

## 2021-12-01 PROCEDURE — 99214 OFFICE O/P EST MOD 30 MIN: CPT | Performed by: INTERNAL MEDICINE

## 2021-12-01 PROCEDURE — 1036F TOBACCO NON-USER: CPT | Performed by: INTERNAL MEDICINE

## 2021-12-01 PROCEDURE — 3023F SPIROM DOC REV: CPT | Performed by: INTERNAL MEDICINE

## 2021-12-01 RX ORDER — ALBUTEROL SULFATE 90 UG/1
2 AEROSOL, METERED RESPIRATORY (INHALATION) EVERY 6 HOURS PRN
Qty: 18 G | Refills: 5 | Status: SHIPPED | OUTPATIENT
Start: 2021-12-01 | End: 2022-06-01 | Stop reason: SDUPTHER

## 2021-12-01 RX ORDER — BUDESONIDE AND FORMOTEROL FUMARATE DIHYDRATE 160; 4.5 UG/1; UG/1
AEROSOL RESPIRATORY (INHALATION)
Qty: 10.2 G | Refills: 5 | Status: SHIPPED | OUTPATIENT
Start: 2021-12-01 | End: 2022-06-01 | Stop reason: SDUPTHER

## 2021-12-01 NOTE — PROGRESS NOTES
[facility]                                                                    CHIEF COMPLAINT: follow up COPD       HPI:   Doing good   No cough or sputum  No hemoptysis   Uses Symbicort BID   Uses Albuterol every other day   Not using his O2 durign the day         Past Medical History:   Diagnosis Date    Anemia 10/2019    Hypertension     Osteoarthritis        Past Surgical History:    History reviewed. No pertinent surgical history. Allergies:  has No Known Allergies. Social History:    TOBACCO:   reports that he has never smoked. He has never used smokeless tobacco.  ETOH:   reports previous alcohol use. Family History:   No lung cancer       Current Medications:    Current Outpatient Medications:     diclofenac sodium (VOLTAREN) 1 % GEL, Apply 4 g topically 4 times daily, Disp: 480 g, Rfl: 2    amLODIPine (NORVASC) 10 MG tablet, Take 1 tablet by mouth once daily, Disp: 90 tablet, Rfl: 0    SYMBICORT 160-4.5 MCG/ACT AERO, Inhale 2 puffs by mouth twice daily, Disp: 1 Inhaler, Rfl: 5    tiotropium (SPIRIVA RESPIMAT) 2.5 MCG/ACT AERS inhaler, Inhale 2 puffs into the lungs daily, Disp: 1 Inhaler, Rfl: 5    albuterol sulfate HFA (VENTOLIN HFA) 108 (90 Base) MCG/ACT inhaler, Inhale 2 puffs into the lungs every 6 hours as needed for Wheezing or Shortness of Breath, Disp: 1 Inhaler, Rfl: 6    ErJerGt-RwEoo-W37-FA-C-Succ Ac (FERREX 28 PO), Take by mouth, Disp: , Rfl:     levocetirizine (XYZAL) 5 MG tablet, Take 5 mg by mouth nightly , Disp: , Rfl:         Objective:   PHYSICAL EXAM:    Blood pressure 130/62, pulse 82, resp. rate 16, height 4' 11\" (1.499 m), weight 110 lb (49.9 kg), SpO2 92 %.' on RA  Gen: No distress. Ill-appearing   Eyes: PERRL. No sclera icterus. No conjunctival injection. ENT: No discharge. Pharynx clear. Neck: Trachea midline. No obvious mass. Resp: No accessory muscle use. Few crackles.   Minimal wheezes. No rhonchi. No dullness on percussion. Good air entry. CV: Regular rate. Regular rhythm. No murmur or rub. No edema. GI: Non-tender. Non-distended. No hernia. Skin: Warm and dry. No nodule on exposed extremities. Lymph: No cervical LAD. No supraclavicular LAD. M/S: No cyanosis. No joint deformity. No clubbing. Neuro: Awake. Alert. Moves all four extremities. Psych: Oriented x 3. No anxiety. DATA reviewed by me:   PFTs 09/09/2020 FVC 2.35(93%) FEV1 1.13(61%) FEV1/FVC 48% TLC(%) DLCO (%) 6MW F Res Ex    CT chest 2/15/2020   Moderate-to-severe chronic lung disease is noted with mixed features. Areas  of paraseptal emphysema are noted as well as areas of end-stage honeycomb  lung. The honeycomb lung is most severe at the lung bases. UIP is  considered although there is not much ground-glass disease present. Sequela  from a remote infectious or inflammatory insult is considered. Connective  tissue related lung disease and chronic hypersensitivity pneumonitis are  considered. Within the right upper lobe there is a nodule favoring an area of nodular  scarring measuring 1.5 x 0.7 cm. It should be treated initially as a nodule  and stability demonstrated with follow-up  The heart is enlarged. Coronary artery calcifications are noted. Thoracic  aortic calcifications are noted. CT chest 5/23/2020   No acute intra-thoracic abnormalities  Chronic paraseptal emphysema with honeycombing and bronchiectasis in the lower lobe  Persistent RUL 9 mm nodule  Mild cardiomegaly  Coronary artery atherosclerosis    CT chest 9/11/2020    Mediastinum: Thyroid gland appears normal.  Small mediastinal and hilar nodes are noted. Coronary artery calcification  is seen. Small hiatal hernia seen. There is nonspecific thickening at the  GE junction. Lungs/pleura: Subpleural reticular opacities are seen throughout the lungs  bilaterally.   Scattered areas of subpleural honeycombing are seen, greatest  at the lung bases. Focal nodular density in the right upper lobe is redemonstrated. IMPRESSION:   Stable chest CT. No obvious change in pulmonary fibrosis and dominant  irregular nodule in the right upper. Connective tissue disease work-up 3/9/2020  Negative/normal RF CCP BLAKE CK aldolase anti-SCL anti-SSA/SSB     Overnight pulse oximeter 6/6/2021 no significant desaturation    Assessment:       · Severe COPD   · Pulmonary bronchiectasis  · Hypoxia on exertion   · ILD on CT 2/15/2020 . DDx IPF, hypersensitivity pneumonitis, NSIP, CTD-ILD and eosinophilic pneumonia. Unremarkable connective tissue disease work-up with no peripheral eosinophilia. Not candidate for further evaluation/management given age and comorbid conditions. Supportive care is main course of treatment  · RUL 1.5 x 0.7 cm nodule on CT 2/15/2020. Stable on repeat CT 9/11/21. DDx inflammatory, granuloma, and malignancy. Patient/family not interested in further follow-up. Not interested in cancer therapy if he is to have lung cancer. · Dyspnea -secondary to above. 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 Advised to titrate O2 using her pulse oximeter- target O2 sat 90-92%. · Acapella QID to mobilize respiratory secretions. · Pulmonary rehab referral   · Patient is up to date with Covid, pneumococcal vaccine and influenza vaccine.    · Follow-up in 6 months

## 2021-12-13 RX ORDER — AMLODIPINE BESYLATE 10 MG/1
TABLET ORAL
Qty: 90 TABLET | Refills: 0 | Status: SHIPPED | OUTPATIENT
Start: 2021-12-13 | End: 2022-03-28

## 2022-03-28 RX ORDER — AMLODIPINE BESYLATE 10 MG/1
TABLET ORAL
Qty: 30 TABLET | Refills: 0 | Status: SHIPPED | OUTPATIENT
Start: 2022-03-28 | End: 2022-04-18

## 2022-04-18 RX ORDER — AMLODIPINE BESYLATE 10 MG/1
TABLET ORAL
Qty: 30 TABLET | Refills: 1 | Status: SHIPPED | OUTPATIENT
Start: 2022-04-18 | End: 2022-06-16 | Stop reason: SDUPTHER

## 2022-04-21 ENCOUNTER — TELEPHONE (OUTPATIENT)
Dept: INTERNAL MEDICINE CLINIC | Age: 87
End: 2022-04-21

## 2022-04-21 ENCOUNTER — HOSPITAL ENCOUNTER (EMERGENCY)
Age: 87
Discharge: HOME | End: 2022-04-21
Attending: EMERGENCY MEDICINE
Payer: MEDICARE

## 2022-04-21 VITALS
DIASTOLIC BLOOD PRESSURE: 69 MMHG | RESPIRATION RATE: 16 BRPM | TEMPERATURE: 97 F | OXYGEN SATURATION: 90 % | BODY MASS INDEX: 19.63 KG/M2 | HEART RATE: 107 BPM | SYSTOLIC BLOOD PRESSURE: 166 MMHG | WEIGHT: 100 LBS | HEIGHT: 60 IN

## 2022-04-21 DIAGNOSIS — H57.89 IRRITATION OF RIGHT EYE: Primary | ICD-10-CM

## 2022-04-21 PROCEDURE — 6370000000 HC RX 637 (ALT 250 FOR IP): Performed by: EMERGENCY MEDICINE

## 2022-04-21 PROCEDURE — 99283 EMERGENCY DEPT VISIT LOW MDM: CPT

## 2022-04-21 RX ORDER — TETRACAINE HYDROCHLORIDE 5 MG/ML
1 SOLUTION OPHTHALMIC ONCE
Status: COMPLETED | OUTPATIENT
Start: 2022-04-21 | End: 2022-04-21

## 2022-04-21 RX ORDER — DIPHENHYDRAMINE HCL 25 MG
1 CAPSULE ORAL EVERY 4 HOURS PRN
Qty: 30 ML | Refills: 0 | Status: SHIPPED | OUTPATIENT
Start: 2022-04-21

## 2022-04-21 RX ORDER — ERYTHROMYCIN 5 MG/G
OINTMENT OPHTHALMIC
Qty: 3.5 G | Refills: 0 | Status: SHIPPED | OUTPATIENT
Start: 2022-04-21 | End: 2022-05-01

## 2022-04-21 RX ADMIN — TETRACAINE HYDROCHLORIDE 1 DROP: 5 SOLUTION OPHTHALMIC at 11:09

## 2022-04-21 RX ADMIN — FLUORESCEIN SODIUM 1 MG: 1 STRIP OPHTHALMIC at 11:08

## 2022-04-21 ASSESSMENT — PAIN SCALES - GENERAL: PAINLEVEL_OUTOF10: 10

## 2022-04-21 ASSESSMENT — PAIN - FUNCTIONAL ASSESSMENT: PAIN_FUNCTIONAL_ASSESSMENT: 0-10

## 2022-04-21 NOTE — ED NOTES
Patient d/c'd. Patient and family agreeable to d/c instructions and aware medications called in to preferred pharmacy. Needs denied at time of discharge.       Alex Jason RN  04/21/22 0432

## 2022-04-21 NOTE — TELEPHONE ENCOUNTER
----- Message from Leigh Souza MD sent at 4/21/2022  9:34 AM EDT -----  Contact: [phone] (G)  ED   ----- Message -----  From: Magali Rust MA  Sent: 4/21/2022   9:32 AM EDT  To: Leigh Souza MD    Pt called with a complaint of pain in his right eye,watery with blurred vision started yesterday he also has head aches and hot flashes no fever and no other symptoms  Please advise       [address] [phone] (Ph: [phone])

## 2022-04-21 NOTE — ED PROVIDER NOTES
CHIEF COMPLAINT  Eye Pain (pt c/o pain in right eye 2 days, today pt states he is very blurry vision in right and a little blurry in left, pt also has complaint of head feels hot for past few months)      HISTORY OF PRESENT ILLNESS  Naun Kate is a 80 y.o. male with a history of arthritis hypertension presenting for evaluation of eye irritation. He is accompanied by his daughter who provides [place] language interpretation. Patient has had eye irritation, right eye pain for the past 2 days. He has had blurred vision as a result of this. According to his daughter, he watches screens and his phone screen for long periods of time. There has been no significant redness of the eye, no history of foreign body, no drainage from the eye. No headache. No pain along the temporal aspect of the head. No fevers. He previously has had glaucoma but had surgery to correct this. Past Medical History:   Diagnosis Date    Anemia 10/2019    Hypertension     Osteoarthritis      History reviewed. No pertinent surgical history. History reviewed. No pertinent family history. Social History     Socioeconomic History    Marital status:       Spouse name: Not on file    Number of children: Not on file    Years of education: Not on file    Highest education level: Not on file   Occupational History    Not on file   Tobacco Use    Smoking status: Never Smoker    Smokeless tobacco: Never Used   Vaping Use    Vaping Use: Never used   Substance and Sexual Activity    Alcohol use: Not Currently    Drug use: Never    Sexual activity: Not Currently   Other Topics Concern    Not on file   Social History Narrative    Not on file     Social Determinants of Health     Financial Resource Strain:     Difficulty of Paying Living Expenses: Not on file   Food Insecurity:     Worried About Running Out of Food in the Last Year: Not on file    Umm of Food in the Last Year: Not on file   Transportation Needs:     Lack of Transportation (Medical): Not on file    Lack of Transportation (Non-Medical): Not on file   Physical Activity:     Days of Exercise per Week: Not on file    Minutes of Exercise per Session: Not on file   Stress:     Feeling of Stress : Not on file   Social Connections:     Frequency of Communication with Friends and Family: Not on file    Frequency of Social Gatherings with Friends and Family: Not on file    Attends Shinto Services: Not on file    Active Member of [address] [place] [address] or Organizations: Not on file    Attends Club or Organization Meetings: Not on file    Marital Status: Not on file   Intimate Partner Violence:     Fear of Current or Ex-Partner: Not on file    Emotionally Abused: Not on file    Physically Abused: Not on file    Sexually Abused: Not on file   Housing Stability:     Unable to Pay for Housing in the Last Year: Not on file    Number of [place] in the Last Year: Not on file    Unstable Housing in the Last Year: Not on file     No current facility-administered medications for this encounter. Current Outpatient Medications   Medication Sig Dispense Refill    erythromycin (ROMYCIN) 5 MG/GM ophthalmic ointment Apply a thin line of ointment to the lower eyelid every 4-6 hours for the next week.  3.5 g 0    dextran 70-hypromellose (ARTIFICIAL TEARS) 0.1-0.3 % SOLN opthalmic solution Place 1 drop into both eyes every 4 hours as needed (eye irritation, dryness) 30 mL 0    amLODIPine (NORVASC) 10 MG tablet Take 1 tablet by mouth once daily 30 tablet 1    tiotropium (SPIRIVA RESPIMAT) 2.5 MCG/ACT AERS inhaler Inhale 2 puffs into the lungs daily 4 g 5    albuterol sulfate HFA (VENTOLIN HFA) 108 (90 Base) MCG/ACT inhaler Inhale 2 puffs into the lungs every 6 hours as needed for Wheezing or Shortness of Breath 18 g 5    SYMBICORT 160-4.5 MCG/ACT AERO Inhale 2 puffs by mouth twice daily 10.2 g 5    diclofenac sodium (VOLTAREN) 1 % GEL Apply 4 g topically 4 times daily 480 g 2    MzVuwMc-UdUrk-V14-FA-C-Succ Ac (FERREX 28 PO) Take by mouth      levocetirizine (XYZAL) 5 MG tablet Take 5 mg by mouth nightly        No Known Allergies    REVIEW OF SYSTEMS  Positive and pertinent negatives as per HPI. All other systems were reviewed and are negative. PHYSICAL EXAM  BP (!) 166/69   Pulse 107   Temp 97 °F (36.1 °C) (Oral)   Resp 16   Ht 5' (1.524 m)   Wt 100 lb (45.4 kg)   SpO2 90%   BMI 19.53 kg/m²   GENERAL APPEARANCE: Awake and alert. Cooperative. HEAD: Normocephalic. Atraumatic. No pain over palpation of the temporal artery  EYES: PERRL. EOM's grossly intact. Some mild right-sided periorbital swelling. No overt chemosis or conjunctivitis. Fluorescein stain applied, no corneal abrasion. Intraocular pressures were checked, 13 mmHg and the right eye, 12 in the left eye ENT: Mucous membranes are moist.   EXTREMITIES: No peripheral edema. No acute deformities. SKIN: Warm and dry. No acute rashes. NEUROLOGICAL: Alert and oriented X 3. No focal deficit    LABS  I have reviewed all labs for this visit. No results found for this visit on 04/21/22. RADIOLOGY  X-RAYS:  I have reviewed radiologic plain film image(s). ALL OTHER NON-PLAIN FILM IMAGES SUCH AS CT, ULTRASOUND AND MRI HAVE BEEN READ BY THE RADIOLOGIST. No orders to display          No results found. ED COURSE/MDM  Patient seen and evaluated. Patient presenting with right eye irritation. There is no pain over palpation of the temporal artery so I doubt temporal arteritis as an explanation. There is no evidence of corneal abrasion. This could be early conjunctivitis unclear bacterial versus viral.  This also could be keratitis or dry eye as patient has had prolonged periods of time that he is watching screens. Intraocular pressures were normal today and I doubt acute angle-closure glaucoma especially in the presence of prior surgical correction.   There is also mention of patient feeling hot of over his head this has been ongoing for the past several months and has been present ever since he had COVID. There is no additional symptoms along with this and advised on PCP follow-up for this. He will be prescribed erythromycin ointment in case this is a early bacterial conjunctivitis and artificial tears. Advised on follow-up with his ophthalmologist or optometrist.    The patient will be discharged from the emergency department. The patient was counseled on their diagnosis and any medications prescribed. They were advised on the need for PCP followup. They were counseled on the need to return to the emergency department if any of their symptoms were to worsen, change or have any other concerns. Discharged in stable condition. Patient was given scripts for the following medications. I counseled patient how to take these medications. New Prescriptions    DEXTRAN 70-HYPROMELLOSE (ARTIFICIAL TEARS) 0.1-0.3 % SOLN OPTHALMIC SOLUTION    Place 1 drop into both eyes every 4 hours as needed (eye irritation, dryness)    ERYTHROMYCIN (ROMYCIN) 5 MG/GM OPHTHALMIC OINTMENT    Apply a thin line of ointment to the lower eyelid every 4-6 hours for the next week. CLINICAL IMPRESSION  1. Irritation of right eye        Blood pressure (!) 166/69, pulse 107, temperature 97 °F (36.1 °C), temperature source Oral, resp. rate 16, height 5' (1.524 m), weight 100 lb (45.4 kg), SpO2 90 %. DISPOSITION  Randy Blancas was discharged to home in stable condition. This chart was generated in part by using Dragon Dictation system and may contain errors related to that system including errors in grammar, punctuation, and spelling, as well as words and phrases that may be inappropriate. If there are any questions or concerns please feel free to contact the dictating provider for clarification.      Ros Lerma MD  04/21/22 2886

## 2022-06-01 ENCOUNTER — OFFICE VISIT (OUTPATIENT)
Dept: PULMONOLOGY | Age: 87
End: 2022-06-01
Payer: MEDICARE

## 2022-06-01 VITALS
BODY MASS INDEX: 20.3 KG/M2 | SYSTOLIC BLOOD PRESSURE: 142 MMHG | OXYGEN SATURATION: 96 % | HEIGHT: 60 IN | DIASTOLIC BLOOD PRESSURE: 68 MMHG | HEART RATE: 80 BPM | WEIGHT: 103.4 LBS | RESPIRATION RATE: 16 BRPM

## 2022-06-01 DIAGNOSIS — J44.9 COPD, SEVERE (HCC): ICD-10-CM

## 2022-06-01 DIAGNOSIS — J47.9 BRONCHIECTASIS WITHOUT COMPLICATION (HCC): ICD-10-CM

## 2022-06-01 DIAGNOSIS — J84.9 ILD (INTERSTITIAL LUNG DISEASE) (HCC): Primary | ICD-10-CM

## 2022-06-01 PROCEDURE — 99214 OFFICE O/P EST MOD 30 MIN: CPT | Performed by: INTERNAL MEDICINE

## 2022-06-01 PROCEDURE — G8420 CALC BMI NORM PARAMETERS: HCPCS | Performed by: INTERNAL MEDICINE

## 2022-06-01 PROCEDURE — 3023F SPIROM DOC REV: CPT | Performed by: INTERNAL MEDICINE

## 2022-06-01 PROCEDURE — 1036F TOBACCO NON-USER: CPT | Performed by: INTERNAL MEDICINE

## 2022-06-01 PROCEDURE — 1123F ACP DISCUSS/DSCN MKR DOCD: CPT | Performed by: INTERNAL MEDICINE

## 2022-06-01 PROCEDURE — G8427 DOCREV CUR MEDS BY ELIG CLIN: HCPCS | Performed by: INTERNAL MEDICINE

## 2022-06-01 RX ORDER — BUDESONIDE AND FORMOTEROL FUMARATE DIHYDRATE 160; 4.5 UG/1; UG/1
AEROSOL RESPIRATORY (INHALATION)
Qty: 10.2 G | Refills: 5 | Status: SHIPPED | OUTPATIENT
Start: 2022-06-01

## 2022-06-01 RX ORDER — ALBUTEROL SULFATE 90 UG/1
2 AEROSOL, METERED RESPIRATORY (INHALATION) EVERY 6 HOURS PRN
Qty: 18 G | Refills: 5 | Status: SHIPPED | OUTPATIENT
Start: 2022-06-01

## 2022-06-01 NOTE — PROGRESS NOTES
[facility]                                                                    CHIEF COMPLAINT: follow up COPD      HPI:   Doing good   No cough or sputum  No hemoptysis   No smoking   Uses Albuterol every other day   Uses O2 on exertion       Past Medical History:   Diagnosis Date    Anemia 10/2019    Hypertension     Osteoarthritis        Past Surgical History:    History reviewed. No pertinent surgical history. Allergies:  has No Known Allergies. Social History:    TOBACCO:   reports that he has never smoked. He has never used smokeless tobacco.  ETOH:   reports previous alcohol use. Family History:   No lung cancer       Current Medications:    Current Outpatient Medications:     tiotropium (SPIRIVA RESPIMAT) 2.5 MCG/ACT AERS inhaler, Inhale 2 puffs into the lungs daily, Disp: 4 g, Rfl: 5    albuterol sulfate HFA (VENTOLIN HFA) 108 (90 Base) MCG/ACT inhaler, Inhale 2 puffs into the lungs every 6 hours as needed for Wheezing or Shortness of Breath, Disp: 18 g, Rfl: 5    SYMBICORT 160-4.5 MCG/ACT AERO, Inhale 2 puffs by mouth twice daily, Disp: 10.2 g, Rfl: 5    dextran 70-hypromellose (ARTIFICIAL TEARS) 0.1-0.3 % SOLN opthalmic solution, Place 1 drop into both eyes every 4 hours as needed (eye irritation, dryness), Disp: 30 mL, Rfl: 0    amLODIPine (NORVASC) 10 MG tablet, Take 1 tablet by mouth once daily, Disp: 30 tablet, Rfl: 1    diclofenac sodium (VOLTAREN) 1 % GEL, Apply 4 g topically 4 times daily, Disp: 480 g, Rfl: 2    [id number]-[id number]-X78-FA-C-Succ Ac (FERREX 28 PO), Take by mouth, Disp: , Rfl:     levocetirizine (XYZAL) 5 MG tablet, Take 5 mg by mouth nightly , Disp: , Rfl:         Objective:   PHYSICAL EXAM:    Blood pressure (!) 142/68, pulse 80, resp. rate 16, height 5' (1.524 m), weight 103 lb 6.4 oz (46.9 kg), SpO2 96 %.' on RA  Gen: No distress. Ill-appearing   Eyes: PERRL. No sclera icterus.  No conjunctival injection. ENT: No discharge. Pharynx clear. Neck: Trachea midline. No obvious mass. Resp: No accessory muscle use. No crackles. Minimal wheezes. No rhonchi. No dullness on percussion. Good air entry. CV: Regular rate. Regular rhythm. No murmur or rub. No edema. GI: Non-tender. Non-distended. No hernia. Skin: Warm and dry. No nodule on exposed extremities. Lymph: No cervical LAD. No supraclavicular LAD. M/S: No cyanosis. No joint deformity. No clubbing. Neuro: Awake. Alert. Moves all four extremities. Psych: Oriented x 3. No anxiety. DATA reviewed by me:   PFTs 09/09/2020 FVC 2.35(93%) FEV1 1.13(61%) FEV1/FVC 48% TLC(%) DLCO (%) 6MW F Res Ex    CT chest 2/15/2020   Moderate-to-severe chronic lung disease is noted with mixed features. Areas  of paraseptal emphysema are noted as well as areas of end-stage honeycomb  lung. The honeycomb lung is most severe at the lung bases. UIP is  considered although there is not much ground-glass disease present. Sequela  from a remote infectious or inflammatory insult is considered. Connective  tissue related lung disease and chronic hypersensitivity pneumonitis are  considered. Within the right upper lobe there is a nodule favoring an area of nodular  scarring measuring 1.5 x 0.7 cm. It should be treated initially as a nodule  and stability demonstrated with follow-up  The heart is enlarged. Coronary artery calcifications are noted. Thoracic  aortic calcifications are noted. CT chest 5/23/2020   No acute intra-thoracic abnormalities  Chronic paraseptal emphysema with honeycombing and bronchiectasis in the lower lobe  Persistent RUL 9 mm nodule  Mild cardiomegaly  Coronary artery atherosclerosis    CT chest 9/11/2020    Mediastinum: Thyroid gland appears normal.  Small mediastinal and hilar nodes are noted. Coronary artery calcification  is seen. Small hiatal hernia seen.   There is nonspecific thickening at the  GE junction. Lungs/pleura: Subpleural reticular opacities are seen throughout the lungs  bilaterally. Scattered areas of subpleural honeycombing are seen, greatest  at the lung bases. Focal nodular density in the right upper lobe is redemonstrated. IMPRESSION:   Stable chest CT. No obvious change in pulmonary fibrosis and dominant  irregular nodule in the right upper. Connective tissue disease work-up 3/9/2020  Negative/normal RF CCP BLAKE CK aldolase anti-SCL anti-SSA/SSB     Overnight pulse oximeter 6/6/2021 no significant desaturation    Assessment:       · Severe COPD   · Pulmonary bronchiectasis  · Hypoxia on exertion   · ILD on CT 2/15/2020 . DDx IPF, hypersensitivity pneumonitis, NSIP, CTD-ILD and eosinophilic pneumonia. Unremarkable connective tissue disease work-up with no peripheral eosinophilia. Not candidate for further evaluation/management given age and comorbid conditions. Supportive care is main course of treatment  · RUL 1.5 x 0.7 cm nodule on CT 2/15/2020. Stable on repeat CT 9/11/21. DDx inflammatory, granuloma, and malignancy. Patient/family not interested in further follow-up. Not interested in cancer therapy if he is to have lung cancer. · Dyspnea -secondary to above. Much improved with inhaled bronchodilators  · Herminio Chaidez for 60 years with no respiratory protection  · Lifelong non-smoker      Plan:       · Continue Symbicort 160/4.5 2 puffs BID  · Continue Spiriva and Albuterol 2 puffs Q4-6 hrs PRN  · Continue O2 1-2 LPM on exertion. Advised to titrate O2 using her pulse oximeter- target O2 sat 90-92%. · Patient/family still not interested in further follow-up/evaluation of pulmonary nodule. Not interested in any cancer therapy if patient is to have lung cancer. · Advised to monitor blood pressure and notify PCP if uncontrolled  · Acapella QID to mobilize respiratory secretions. · Patient is up to date with Covid, pneumococcal vaccine and influenza vaccine.    · Follow-up in 6 months

## 2022-06-16 ENCOUNTER — OFFICE VISIT (OUTPATIENT)
Dept: INTERNAL MEDICINE CLINIC | Age: 87
End: 2022-06-16

## 2022-06-16 VITALS
SYSTOLIC BLOOD PRESSURE: 158 MMHG | HEART RATE: 76 BPM | HEIGHT: 60 IN | DIASTOLIC BLOOD PRESSURE: 62 MMHG | WEIGHT: 105 LBS | BODY MASS INDEX: 20.62 KG/M2

## 2022-06-16 DIAGNOSIS — M17.0 OSTEOARTHRITIS OF BOTH KNEES, UNSPECIFIED OSTEOARTHRITIS TYPE: ICD-10-CM

## 2022-06-16 DIAGNOSIS — I10 BENIGN ESSENTIAL HTN: Primary | ICD-10-CM

## 2022-06-16 DIAGNOSIS — J44.9 CHRONIC OBSTRUCTIVE PULMONARY DISEASE, UNSPECIFIED COPD TYPE (HCC): ICD-10-CM

## 2022-06-16 PROCEDURE — G8427 DOCREV CUR MEDS BY ELIG CLIN: HCPCS | Performed by: INTERNAL MEDICINE

## 2022-06-16 PROCEDURE — 1036F TOBACCO NON-USER: CPT | Performed by: INTERNAL MEDICINE

## 2022-06-16 PROCEDURE — 1123F ACP DISCUSS/DSCN MKR DOCD: CPT | Performed by: INTERNAL MEDICINE

## 2022-06-16 PROCEDURE — 99213 OFFICE O/P EST LOW 20 MIN: CPT | Performed by: INTERNAL MEDICINE

## 2022-06-16 PROCEDURE — G8420 CALC BMI NORM PARAMETERS: HCPCS | Performed by: INTERNAL MEDICINE

## 2022-06-16 PROCEDURE — 3023F SPIROM DOC REV: CPT | Performed by: INTERNAL MEDICINE

## 2022-06-16 RX ORDER — AMLODIPINE BESYLATE 10 MG/1
TABLET ORAL
Qty: 30 TABLET | Refills: 5 | Status: SHIPPED | OUTPATIENT
Start: 2022-06-16

## 2022-06-16 ASSESSMENT — ENCOUNTER SYMPTOMS
DIARRHEA: 0
WHEEZING: 0
NAUSEA: 0
CHEST TIGHTNESS: 0
ABDOMINAL PAIN: 0
BLOOD IN STOOL: 0
VOMITING: 0
SHORTNESS OF BREATH: 0
COUGH: 0

## 2022-06-16 NOTE — PROGRESS NOTES
Jorge Odell (:  1930) is a 80 y.o. male,Established patient, here for evaluation of the following chief complaint(s):  Follow-up         ASSESSMENT/PLAN:  1. Benign essential HTN  2. Chronic obstructive pulmonary disease, unspecified COPD type ([address]         HTN. Bp is mildly elevated   Continue norvasc 10 mg daily      Anemia- likely iron deficiency. Takes iron pills. Resolved      bronchiectasis  Interstitial lung disease per CXR  Lung nodules- does not want any further work up  [employer] pulmonologist  continue inhalers     OA  Dilcofenac gel to knees is helping          Subjective   SUBJECTIVE/OBJECTIVE:  HPI  Is here for follow up of HTN, anemia and OA     Patient's BP is  controlled on current medications. He stopped taking NSAID as he was found to be anemic,he has been having in the knee joints. diclofenac gel helps   Takes iron pills for anemia.   patient's family is not keen on him getting a colonoscopy given his age      Sees pulmonologist for bronchiectasis,severe COPD and lung nodules    Review of Systems   Constitutional: Negative. Negative for fatigue and fever. Respiratory: Negative for cough, chest tightness, shortness of breath and wheezing. Cardiovascular: Negative for chest pain and palpitations. Gastrointestinal: Negative for abdominal pain, blood in stool, diarrhea, nausea and vomiting. Objective   Physical Exam  Constitutional:       Appearance: He is well-developed. HENT:      Head: Normocephalic and atraumatic. Eyes:      Pupils: Pupils are equal, round, and reactive to light. Neck:      Thyroid: No thyromegaly. Cardiovascular:      Rate and Rhythm: Normal rate and regular rhythm. Heart sounds: Normal heart sounds. No murmur heard. No friction rub. No gallop. Comments: No carotid bruit  Pulmonary:      Effort: Pulmonary effort is normal. No respiratory distress. Breath sounds: Normal breath sounds. No wheezing or rales.    Chest: Chest wall: No tenderness. Abdominal:      General: Bowel sounds are normal. There is no distension. Palpations: Abdomen is soft. There is no mass. Tenderness: There is no abdominal tenderness. There is no guarding or rebound. Musculoskeletal:      Cervical back: Normal range of motion and neck supple. Neurological:      Mental Status: He is alert and oriented to person, place, and time. An electronic signature was used to authenticate this note.     --Ector Meza MD

## 2022-12-01 ENCOUNTER — OFFICE VISIT (OUTPATIENT)
Dept: PULMONOLOGY | Age: 87
End: 2022-12-01
Payer: MEDICARE

## 2022-12-01 VITALS
BODY MASS INDEX: 20.43 KG/M2 | SYSTOLIC BLOOD PRESSURE: 145 MMHG | DIASTOLIC BLOOD PRESSURE: 60 MMHG | WEIGHT: 111 LBS | RESPIRATION RATE: 18 BRPM | HEIGHT: 62 IN | HEART RATE: 100 BPM | OXYGEN SATURATION: 97 %

## 2022-12-01 DIAGNOSIS — R09.02 HYPOXIA: ICD-10-CM

## 2022-12-01 DIAGNOSIS — J47.9 BRONCHIECTASIS WITHOUT COMPLICATION (HCC): Primary | ICD-10-CM

## 2022-12-01 DIAGNOSIS — J44.9 COPD, SEVERE (HCC): ICD-10-CM

## 2022-12-01 PROCEDURE — 3023F SPIROM DOC REV: CPT | Performed by: INTERNAL MEDICINE

## 2022-12-01 PROCEDURE — 1123F ACP DISCUSS/DSCN MKR DOCD: CPT | Performed by: INTERNAL MEDICINE

## 2022-12-01 PROCEDURE — 1036F TOBACCO NON-USER: CPT | Performed by: INTERNAL MEDICINE

## 2022-12-01 PROCEDURE — 99214 OFFICE O/P EST MOD 30 MIN: CPT | Performed by: INTERNAL MEDICINE

## 2022-12-01 PROCEDURE — G8420 CALC BMI NORM PARAMETERS: HCPCS | Performed by: INTERNAL MEDICINE

## 2022-12-01 PROCEDURE — G8427 DOCREV CUR MEDS BY ELIG CLIN: HCPCS | Performed by: INTERNAL MEDICINE

## 2022-12-01 PROCEDURE — G8484 FLU IMMUNIZE NO ADMIN: HCPCS | Performed by: INTERNAL MEDICINE

## 2022-12-01 RX ORDER — ALBUTEROL SULFATE 90 UG/1
2 AEROSOL, METERED RESPIRATORY (INHALATION) EVERY 6 HOURS PRN
Qty: 18 G | Refills: 5 | Status: SHIPPED | OUTPATIENT
Start: 2022-12-01

## 2022-12-01 NOTE — PROGRESS NOTES
[facility]                                                                    CHIEF COMPLAINT: follow up COPD      HPI:   Doing fine  No sputum  No hemoptysis   No smoking   Uses Albuterol every other day   Uses O2 on exertion   History and communications were done thorough online interpretors      Past Medical History:   Diagnosis Date    Anemia 10/2019    Hypertension     Osteoarthritis        Past Surgical History:    No past surgical history on file. Allergies:  has No Known Allergies. Social History:    TOBACCO:   reports that he has never smoked. He has never used smokeless tobacco.  ETOH:   reports that he does not currently use alcohol.       Family History:   No lung cancer       Current Medications:    Current Outpatient Medications:     amLODIPine (NORVASC) 10 MG tablet, Take 1 tablet by mouth once daily, Disp: 30 tablet, Rfl: 5    tiotropium (SPIRIVA RESPIMAT) 2.5 MCG/ACT AERS inhaler, Inhale 2 puffs into the lungs daily, Disp: 4 g, Rfl: 5    albuterol sulfate HFA (VENTOLIN HFA) 108 (90 Base) MCG/ACT inhaler, Inhale 2 puffs into the lungs every 6 hours as needed for Wheezing or Shortness of Breath, Disp: 18 g, Rfl: 5    SYMBICORT 160-4.5 MCG/ACT AERO, Inhale 2 puffs by mouth twice daily, Disp: 10.2 g, Rfl: 5    diclofenac sodium (VOLTAREN) 1 % GEL, Apply 4 g topically 4 times daily, Disp: 480 g, Rfl: 2    dextran 70-hypromellose (ARTIFICIAL TEARS) 0.1-0.3 % SOLN opthalmic solution, Place 1 drop into both eyes every 4 hours as needed (eye irritation, dryness) (Patient not taking: Reported on 12/1/2022), Disp: 30 mL, Rfl: 0    CyAxwUi-FsKls-P41-FA-C-Succ Ac (FERREX 28 PO), Take by mouth (Patient not taking: Reported on 12/1/2022), Disp: , Rfl:     levocetirizine (XYZAL) 5 MG tablet, Take 5 mg by mouth nightly  (Patient not taking: Reported on 12/1/2022), Disp: , Rfl:         Objective:   PHYSICAL EXAM:    Blood pressure (!) 145/60, pulse 100, resp. rate 18, height 5' 2\" (1.575 m), weight 111 lb (50.3 kg), SpO2 97 %.' on RA  Constitutional:  No acute distress. [place] HEENT: no scleral icterus  Neck: No tracheal deviation present. Cardiovascular: Normal heart sounds. Pulmonary/Chest: No wheezes. No rhonchi. + rales. No decreased breath sounds. No accessory muscle usage or stridor. Abdominal: Soft. Musculoskeletal: No cyanosis. No clubbing. Skin: Skin is warm and dry. DATA reviewed by me:   PFTs 09/09/2020 FVC 2.35(93%) FEV1 1.13(61%) FEV1/FVC 48% TLC(%) DLCO (%) 6MW F Res Ex    CT chest 2/15/2020   Moderate-to-severe chronic lung disease is noted with mixed features. Areas  of paraseptal emphysema are noted as well as areas of end-stage honeycomb  lung. The honeycomb lung is most severe at the lung bases. UIP is  considered although there is not much ground-glass disease present. Sequela  from a remote infectious or inflammatory insult is considered. Connective  tissue related lung disease and chronic hypersensitivity pneumonitis are  considered. Within the right upper lobe there is a nodule favoring an area of nodular  scarring measuring 1.5 x 0.7 cm. It should be treated initially as a nodule  and stability demonstrated with follow-up  The heart is enlarged. Coronary artery calcifications are noted. Thoracic  aortic calcifications are noted. CT chest 5/23/2020   No acute intra-thoracic abnormalities  Chronic paraseptal emphysema with honeycombing and bronchiectasis in the lower lobe  Persistent RUL 9 mm nodule  Mild cardiomegaly  Coronary artery atherosclerosis    CT chest 9/11/2020    Mediastinum: Thyroid gland appears normal.  Small mediastinal and hilar nodes are noted. Coronary artery calcification  is seen. Small hiatal hernia seen. There is nonspecific thickening at the  GE junction. Lungs/pleura: Subpleural reticular opacities are seen throughout the lungs  bilaterally.   Scattered areas of subpleural honeycombing are seen, greatest  at the lung bases. Focal nodular density in the right upper lobe is redemonstrated. IMPRESSION:   Stable chest CT. No obvious change in pulmonary fibrosis and dominant  irregular nodule in the right upper. Connective tissue disease work-up 3/9/2020  Negative/normal RF CCP BLAKE CK aldolase anti-SCL anti-SSA/SSB     Overnight pulse oximeter 6/6/2021 no significant desaturation    Assessment:       Severe COPD   Pulmonary bronchiectasis  Hypoxia on exertion   ILD on CT 2/15/2020 . DDx IPF, hypersensitivity pneumonitis, NSIP, CTD-ILD and eosinophilic pneumonia. Unremarkable connective tissue disease work-up with no peripheral eosinophilia. Not candidate for further evaluation/management given age and comorbid conditions. Supportive care is main course of treatment  RUL 1.5 x 0.7 cm nodule on CT 2/15/2020. Stable on repeat CT 9/11/21. DDx inflammatory, granuloma, and malignancy. Patient/family not interested in further follow-up. Not interested in cancer therapy if he is to have lung cancer. Dyspnea -secondary to above. Much improved with inhaled bronchodilators  [place] for 60 years with no respiratory protection  Lifelong non-smoker      Plan:       Continue Symbicort, Spiriva and Albuterol 2 puffs Q4-6 hrs PRN  Continue O2 1-2 LPM on exertion. Advised to titrate O2 using her pulse oximeter- target O2 sat 90-92%. Patient/family still not interested in further follow-up/evaluation of pulmonary nodule. Not interested in any cancer therapy if patient is to have lung cancer. Acapella and Mucinex   Patient is up to date with Covid, pneumococcal vaccine and influenza vaccine.    Follow-up in 6 months

## 2022-12-06 RX ORDER — AMLODIPINE BESYLATE 10 MG/1
TABLET ORAL
Qty: 90 TABLET | Refills: 0 | Status: SHIPPED | OUTPATIENT
Start: 2022-12-06 | End: 2022-12-07 | Stop reason: SDUPTHER

## 2022-12-07 ENCOUNTER — OFFICE VISIT (OUTPATIENT)
Dept: INTERNAL MEDICINE CLINIC | Age: 87
End: 2022-12-07

## 2022-12-07 VITALS
BODY MASS INDEX: 21.79 KG/M2 | SYSTOLIC BLOOD PRESSURE: 142 MMHG | HEART RATE: 80 BPM | HEIGHT: 60 IN | WEIGHT: 111 LBS | DIASTOLIC BLOOD PRESSURE: 78 MMHG

## 2022-12-07 DIAGNOSIS — I10 BENIGN ESSENTIAL HTN: ICD-10-CM

## 2022-12-07 DIAGNOSIS — Z00.00 INITIAL MEDICARE ANNUAL WELLNESS VISIT: ICD-10-CM

## 2022-12-07 DIAGNOSIS — Z00.00 ROUTINE GENERAL MEDICAL EXAMINATION AT A HEALTH CARE FACILITY: Primary | ICD-10-CM

## 2022-12-07 DIAGNOSIS — M79.89 LEFT LEG SWELLING: ICD-10-CM

## 2022-12-07 DIAGNOSIS — J44.9 CHRONIC OBSTRUCTIVE PULMONARY DISEASE, UNSPECIFIED COPD TYPE (HCC): ICD-10-CM

## 2022-12-07 DIAGNOSIS — M17.0 OSTEOARTHRITIS OF BOTH KNEES, UNSPECIFIED OSTEOARTHRITIS TYPE: ICD-10-CM

## 2022-12-07 DIAGNOSIS — Z23 NEED FOR INFLUENZA VACCINATION: ICD-10-CM

## 2022-12-07 LAB
ANION GAP SERPL CALCULATED.3IONS-SCNC: 11 MMOL/L (ref 3–16)
BASOPHILS ABSOLUTE: 0.1 K/UL (ref 0–0.2)
BASOPHILS RELATIVE PERCENT: 1.1 %
BUN BLDV-MCNC: 16 MG/DL (ref 7–20)
CALCIUM SERPL-MCNC: 9 MG/DL (ref 8.3–10.6)
CHLORIDE BLD-SCNC: 110 MMOL/L (ref 99–110)
CO2: 23 MMOL/L (ref 21–32)
CREAT SERPL-MCNC: 1.2 MG/DL (ref 0.8–1.3)
EOSINOPHILS ABSOLUTE: 0.2 K/UL (ref 0–0.6)
EOSINOPHILS RELATIVE PERCENT: 2.5 %
GFR SERPL CREATININE-BSD FRML MDRD: 57 ML/MIN/{1.73_M2}
GLUCOSE BLD-MCNC: 89 MG/DL (ref 70–99)
HCT VFR BLD CALC: 33.1 % (ref 40.5–52.5)
HEMOGLOBIN: 10.9 G/DL (ref 13.5–17.5)
LYMPHOCYTES ABSOLUTE: 2.5 K/UL (ref 1–5.1)
LYMPHOCYTES RELATIVE PERCENT: 30.7 %
MCH RBC QN AUTO: 31.3 PG (ref 26–34)
MCHC RBC AUTO-ENTMCNC: 32.9 G/DL (ref 31–36)
MCV RBC AUTO: 95.2 FL (ref 80–100)
MONOCYTES ABSOLUTE: 0.4 K/UL (ref 0–1.3)
MONOCYTES RELATIVE PERCENT: 4.6 %
NEUTROPHILS ABSOLUTE: 4.9 K/UL (ref 1.7–7.7)
NEUTROPHILS RELATIVE PERCENT: 61.1 %
PDW BLD-RTO: 15.5 % (ref 12.4–15.4)
PLATELET # BLD: 390 K/UL (ref 135–450)
PMV BLD AUTO: 8.6 FL (ref 5–10.5)
POTASSIUM SERPL-SCNC: 4.9 MMOL/L (ref 3.5–5.1)
RBC # BLD: 3.48 M/UL (ref 4.2–5.9)
SODIUM BLD-SCNC: 144 MMOL/L (ref 136–145)
WBC # BLD: 8 K/UL (ref 4–11)

## 2022-12-07 PROCEDURE — G0438 PPPS, INITIAL VISIT: HCPCS | Performed by: INTERNAL MEDICINE

## 2022-12-07 PROCEDURE — 1123F ACP DISCUSS/DSCN MKR DOCD: CPT | Performed by: INTERNAL MEDICINE

## 2022-12-07 PROCEDURE — 90662 IIV NO PRSV INCREASED AG IM: CPT | Performed by: INTERNAL MEDICINE

## 2022-12-07 PROCEDURE — G0008 ADMIN INFLUENZA VIRUS VAC: HCPCS | Performed by: INTERNAL MEDICINE

## 2022-12-07 PROCEDURE — G8484 FLU IMMUNIZE NO ADMIN: HCPCS | Performed by: INTERNAL MEDICINE

## 2022-12-07 RX ORDER — AMLODIPINE BESYLATE 10 MG/1
TABLET ORAL
Qty: 90 TABLET | Refills: 1 | Status: SHIPPED | OUTPATIENT
Start: 2022-12-07 | End: 2022-12-07 | Stop reason: DRUGHIGH

## 2022-12-07 RX ORDER — MELOXICAM 7.5 MG/1
7.5 TABLET ORAL DAILY
Qty: 90 TABLET | Refills: 0 | Status: SHIPPED | OUTPATIENT
Start: 2022-12-07

## 2022-12-07 RX ORDER — AMLODIPINE BESYLATE 10 MG/1
TABLET ORAL
Qty: 1 TABLET | Refills: 0 | Status: SHIPPED
Start: 2022-12-07

## 2022-12-07 ASSESSMENT — PATIENT HEALTH QUESTIONNAIRE - PHQ9
SUM OF ALL RESPONSES TO PHQ QUESTIONS 1-9: 0
2. FEELING DOWN, DEPRESSED OR HOPELESS: 0
1. LITTLE INTEREST OR PLEASURE IN DOING THINGS: 0
SUM OF ALL RESPONSES TO PHQ9 QUESTIONS 1 & 2: 0
SUM OF ALL RESPONSES TO PHQ QUESTIONS 1-9: 0

## 2022-12-07 ASSESSMENT — LIFESTYLE VARIABLES
HOW OFTEN DO YOU HAVE A DRINK CONTAINING ALCOHOL: NEVER
HOW MANY STANDARD DRINKS CONTAINING ALCOHOL DO YOU HAVE ON A TYPICAL DAY: PATIENT DOES NOT DRINK

## 2022-12-07 NOTE — PROGRESS NOTES
Medicare Annual Wellness Visit    Sara Ledezma is here for Medicare AWV    Assessment & Plan   Routine general medical examination at a health care facility  Need for influenza vaccination  -     Influenza, FLUZONE HIGH-DOSE, (age 72 y+), IM, Preservative Free, 0.7 mL  Benign essential HTN  Chronic obstructive pulmonary disease, unspecified COPD type ([facility] [address]  Osteoarthritis of both knees, unspecified osteoarthritis type  Left leg swelling    Recommendations for Preventive Services Due: see orders and patient instructions/AVS.  Recommended screening schedule for the next 5-10 years is provided to the patient in written form: see Patient Instructions/AVS.     No follow-ups on file. Subjective   The following acute and/or chronic problems were also addressed today:   Diagnosis Orders   1. Routine general medical examination at a health care facility        2. Need for influenza vaccination  Influenza, FLUZONE HIGH-DOSE, (age 72 y+), IM, Preservative Free, 0.7 mL      3. Benign essential HTN  Basic Metabolic Panel    CBC with Auto Differential      4. Chronic obstructive pulmonary disease, unspecified COPD type ([facility] [address]        5. Osteoarthritis of both knees, unspecified osteoarthritis type        6. Left leg swelling  VL Extremity Venous Left            HTN. Bp isgood  Decrease norvasc 10 mg  1/2 daily as he is having some headaches      Anemia- likely iron deficiency. Takes iron pills. Resolved      bronchiectasis  Interstitial lung disease per CXR  Lung nodules- does not want any further work up  [employer] pulmonologist  continue inhalers     OA  Meloxicam 7.5 mg daily     Patient's complete Health Risk Assessment and screening values have been reviewed and are found in [address]. The following problems were reviewed today and where indicated follow up appointments were made and/or referrals ordered.     Positive Risk Factor Screenings with Interventions:    Fall Risk:  Do you feel unsteady or are you worried about falling? : (!) yes  2 or more falls in past year?: no  Fall with injury in past year?: no     Interventions:    Patient declines any further evaluation or treatment            General HRA Questions:  Select all that apply: (!) New or Increased Pain    Pain Interventions:  Meloxicam started   See AVS for additional education material  See A/P for any pertinent orders       Weight and Activity:  Physical Activity: Inactive    Days of Exercise per Week: 0 days    Minutes of Exercise per Session: 0 min     On average, how many days per week do you engage in moderate to strenuous exercise (like a brisk walk)?: 0 days  Have you lost any weight without trying in the past 3 months?: No  Body mass index: 21.68      Unintentional Weight Loss Interventions:  See above  Inactivity Interventions:  Patient declines any further evaluation or treatment      Dentist Screen:  Have you seen the dentist within the past year?: (!) No    Intervention:  No need to see dentis  See AVS for additional education material  See A/P for any pertinent orders       ADL's:   Patient reports needing help with:  Select all that apply: (!) Laundry    Interventions:  Family helps   See AVS for additional education material  See A/P for any pertinent orders                    Objective   Vitals:    12/07/22 1031   BP: (!) 142/78   Pulse: 80   Weight: 111 lb (50.3 kg)   Height: 5' (1.524 m)      Body mass index is 21.68 kg/m².       Physical Exam  General Appearance: alert and oriented to person, place and time, well developed and well- nourished, in no acute distress  Skin: warm and dry, no rash or erythema  Head: normocephalic and atraumatic  Neck: supple and non-tender without mass, no thyromegaly or thyroid nodules, no cervical lymphadenopathy  Pulmonary/Chest: clear to auscultation bilaterally- no wheezes, rales or rhonchi, normal air movement, no respiratory distress  Cardiovascular: normal rate, regular rhythm, normal S1 and S2, no murmurs, rubs, clicks, or gallops, distal pulses intact, no carotid bruits  Abdomen: soft, non-tender, non-distended, normal bowel sounds, no masses or organomegaly  Extremities: no cyanosis, clubbing  left leg swelling        No Known Allergies  Prior to Visit Medications    Medication Sig Taking?  Authorizing Provider   amLODIPine (NORVASC) 10 MG tablet Take 1 tablet by mouth once daily  Ev Hunt MD   tiotropium (SPIRIVA RESPIMAT) 2.5 MCG/ACT AERS inhaler Inhale 2 puffs into the lungs daily  Ariadna Woodruff MD   albuterol sulfate HFA (VENTOLIN HFA) 108 (90 Base) MCG/ACT inhaler Inhale 2 puffs into the lungs every 6 hours as needed for Wheezing or Shortness of Breath  Ariadna Woodruff MD   SYMBICORT 160-4.5 MCG/ACT AERO Inhale 2 puffs by mouth twice daily  Ariadna Woodruff MD   dextran 70-hypromellose (ARTIFICIAL TEARS) 0.1-0.3 % SOLN opthalmic solution Place 1 drop into both eyes every 4 hours as needed (eye irritation, dryness)  Patient not taking: Reported on 12/1/2022  Will Bonner MD   diclofenac sodium (VOLTAREN) 1 % GEL Apply 4 g topically 4 times daily  Ev Hunt MD   [id number]-[id number]-[id number]-FA-C-Succ Ac (FERREX 28 PO) Take by mouth  Patient not taking: Reported on 12/1/2022  Historical Provider, MD   levocetirizine (XYZAL) 5 MG tablet Take 5 mg by mouth nightly   Patient not taking: Reported on 12/1/2022  Historical Provider, MD [place] (Including outside providers/suppliers regularly involved in providing care):   Patient Care Team:  Ev Hunt MD as PCP - General (Internal Medicine)  Ev Hunt MD as PCP - [facility]  Ariadna Woodruff MD as Consulting Physician (Pulmonology)     Reviewed and updated this visit:  Tobacco  Allergies  Meds  Problems  Med Hx  Surg Hx  Soc Hx  Fam Hx

## 2022-12-07 NOTE — PATIENT INSTRUCTIONS
Preventing Falls: Care Instructions  Your Care Instructions     Getting around your home safely can be a challenge if you have injuries or health problems that make it easy for you to fall. Loose rugs and furniture in walkways are among the dangers for many older people who have problems walking or who have poor eyesight. People who have conditions such as arthritis, osteoporosis, or dementia also have to be careful not to fall. You can make your home safer with a few simple measures. Follow-up care is a key part of your treatment and safety. Be sure to make and go to all appointments, and call your doctor if you are having problems. It's also a good idea to know your test results and keep a list of the medicines you take. How can you care for yourself at home? Taking care of yourself  Exercise regularly to improve your strength, muscle tone, and balance. Walk if you can. Swimming may be a good choice if you cannot walk easily. Have your vision and hearing checked each year or any time you notice a change. If you have trouble seeing and hearing, you might not be able to avoid objects and could lose your balance. Know the side effects of the medicines you take. Ask your doctor or pharmacist whether the medicines you take can affect your balance. Sleeping pills or sedatives can affect your balance. Limit the amount of alcohol you drink. Alcohol can impair your balance and other senses. Ask your doctor whether calluses or corns on your feet need to be removed. If you wear loose-fitting shoes because of calluses or corns, you can lose your balance and fall. Talk to your doctor if you have numbness in your feet. You may get dizzy if you do not drink enough water. To prevent dehydration, drink plenty of fluids. Choose water and other clear liquids. If you have kidney, heart, or liver disease and have to limit fluids, talk with your doctor before you increase the amount of fluids you drink.   Preventing falls at home  Remove raised doorway thresholds, throw rugs, and clutter. Repair loose carpet or raised areas in the floor. Move furniture and electrical cords to keep them out of walking paths. Use nonskid floor wax, and wipe up spills right away, especially on ceramic tile floors. If you use a walker or cane, put rubber tips on it. If you use crutches, clean the bottoms of them regularly with an abrasive pad, such as steel wool. Keep your house well lit, especially stairways, porches, and outside walkways. Use night-lights in areas such as hallways and bathrooms. Add extra light switches or use remote switches (such as switches that go on or off when you clap your hands) to make it easier to turn lights on if you have to get up during the night. Install sturdy handrails on stairways. Move items in your cabinets so that the things you use a lot are on the lower shelves (about waist level). Keep a cordless phone and a flashlight with new batteries by your bed. If possible, put a phone in each of the main rooms of your house, or carry a cell phone in case you fall and cannot reach a phone. Or, you can wear a device around your neck or wrist. You push a button that sends a signal for help. Wear low-heeled shoes that fit well and give your feet good support. Use footwear with nonskid soles. Check the heels and soles of your shoes for wear. Repair or replace worn heels or soles. Do not wear socks without shoes on wood floors. Walk on the grass when the sidewalks are slippery. If you live in an area that gets snow and ice in the winter, sprinkle salt on slippery steps and sidewalks. Or ask a family member or friend to do this for you. Preventing falls in the bath  Install grab bars and nonskid mats inside and outside your shower or tub and near the toilet and sinks. Use shower chairs and bath benches. Use a hand-held shower head that will allow you to sit while showering.   Get into a tub or shower by putting the weaker leg in first. Get out of a tub or shower with your strong side first.  Repair loose toilet seats and consider installing a raised toilet seat to make getting on and off the toilet easier. Keep your bathroom door unlocked while you are in the shower. Where can you learn more? Go to https://chpepiceweb.healthExamSoft Worldwide. org and sign in to your Levelert account. Enter [id number] in the [facility] box to learn more about \"Preventing Falls: Care Instructions. \"     If you do not have an account, please click on the \"Sign Up Now\" link. Current as of: May 4, 2022               Content Version: 13.4  © 2006-2022 Healthwise, [email or web address]. Care instructions adapted under license by [facility] ([facility]). If you have questions about a medical condition or this instruction, always ask your healthcare professional. [place] [address] any warranty or liability for your use of this information. Learning About Being Active as an Older Adult  Why is being active important as you get older? Being active is one of the best things you can do for your health. And it's never too late to start. Being active--or getting active, if you aren't already--has definite benefits. It can:  Give you more energy,  Keep your mind sharp. Improve balance to reduce your risk of falls. Help you manage chronic illness with fewer medicines. No matter how old you are, how fit you are, or what health problems you have, there is a form of activity that will work for you. And the more physical activity you can do, the better your overall health will be. What kinds of activity can help you stay healthy? Being more active will make your daily activities easier. Physical activity includes planned exercise and things you do in daily life. There are four types of activity:  Aerobic. Doing aerobic activity makes your heart and lungs strong. Includes walking, dancing, and gardening.   Aim for at least 2½ hours spread throughout the week. It improves your energy and can help you sleep better. Muscle-strengthening. This type of activity can help maintain muscle and strengthen bones. Includes climbing stairs, using resistance bands, and lifting or carrying heavy loads. Aim for at least twice a week. It can help protect the knees and other joints. Stretching. Stretching gives you better range of motion in joints and muscles. Includes upper arm stretches, calf stretches, and gentle yoga. Aim for at least twice a week, preferably after your muscles are warmed up from other activities. It can help you function better in daily life. Balancing. This helps you stay coordinated and have good posture. Includes heel-to-toe walking, constantine chi, and certain types of yoga. Aim for at least 3 days a week. It can reduce your risk of falling. Even if you have a hard time meeting the recommendations, it's better to be more active than less active. All activity done in each category counts toward your weekly total. You'd be surprised how daily things like carrying groceries, keeping up with grandchildren, and taking the stairs can add up. What keeps you from being active? If you've had a hard time being more active, you're not alone. Maybe you remember being able to do more. Or maybe you've never thought of yourself as being active. It's frustrating when you can't do the things you want. Being more active can help. What's holding you back? Getting started. Have a goal, but break it into easy tasks. Small steps build into big accomplishments. Staying motivated. If you feel like skipping your activity, remember your goal. Maybe you want to move better and stay independent. Every activity gets you one step closer. Not feeling your best.  Start with 5 minutes of an activity you enjoy. Prove to yourself you can do it. As you get comfortable, increase your time. You may not be where you want to be.  But you're in the process of getting there. Everyone starts somewhere. How can you find safe ways to stay active? Talk with your doctor about any physical challenges you're facing. Make a plan with your doctor if you have a health problem or aren't sure how to get started with activity. If you're already active, ask your doctor if there is anything you should change to stay safe as your body and health change. If you tend to feel dizzy after you take medicine, avoid activity at that time. Try being active before you take your medicine. This will reduce your risk of falls. If you plan to be active at home, make sure to clear your space before you get started. Remove things like TV cords, coffee tables, and throw rugs. It's safest to have plenty of space to move freely. The key to getting more active is to take it slow and steady. Try to improve only a little bit at a time. Pick just one area to improve on at first. And if an activity hurts, stop and talk to your doctor. Where can you learn more? Go to https://Wagglmitra.Syrenaica. org and sign in to your modulR account. Enter P600 in the Search Health Information box to learn more about \"Learning About Being Active as an Older Adult. \"     If you do not have an account, please click on the \"Sign Up Now\" link. Current as of: January 26, 2022               Content Version: 13.4  © 2006-2022 Healthwise, Incorporated. Care instructions adapted under license by [facility] ([facility]). If you have questions about a medical condition or this instruction, always ask your healthcare professional. [place] [address] any warranty or liability for your use of this information. Learning About Dental Care for Older Adults  Dental care for older adults: Overview  Dental care for older people is much the same as for younger adults. But older adults do have concerns that younger adults do not. Older adults may have problems with gum disease and decay on the roots of their teeth. They may need missing teeth replaced or broken fillings fixed. Or they may have dentures that need to be cared for. Some older adults may have trouble holding a toothbrush. You can help remind the person you are caring for to brush and floss their teeth or to clean their dentures. In some cases, you may need to do the brushing and other dental care tasks. People who have trouble using their hands or who have dementia may need this extra help. How can you help with dental care? Normal dental care  To keep the teeth and gums healthy:  Brush the teeth with fluoride toothpaste twice a day--in the morning and at night--and floss at least once a day. Plaque can quickly build up on the teeth of older adults. Watch for the signs of gum disease. These signs include gums that bleed after brushing or after eating hard foods, such as apples. See a dentist regularly. Many experts recommend checkups every 6 months. Keep the dentist up to date on any new medications the person is taking. Encourage a balanced diet that includes whole grains, vegetables, and fruits, and that is low in saturated fat and sodium. Encourage the person you're caring for not to use tobacco products. They can affect dental and general health. Many older adults have a fixed income and feel that they can't afford dental care. But most towns and cities have programs in which dentists help older adults by lowering fees. Contact your area's public health offices or  for information about dental care in your area. Using a toothbrush  Older adults with arthritis sometimes have trouble brushing their teeth because they can't easily hold the toothbrush. Their hands and fingers may be stiff, painful, or weak. If this is the case, you can: Offer an electric toothbrush. Enlarge the handle of a non-electric toothbrush by wrapping a sponge, an elastic bandage, or adhesive tape around it.   Push the toothbrush handle through a ball made of rubber or soft foam.  Make the handle longer and thicker by taping Popsicle sticks or tongue depressors to it. You may also be able to buy special toothbrushes, toothpaste dispensers, and floss holders. Your doctor may recommend a soft-bristle toothbrush if the person you care for bleeds easily. Bleeding can happen because of a health problem or from certain medicines. A toothpaste for sensitive teeth may help if the person you care for has sensitive teeth. How do you brush and floss someone's teeth? If the person you are caring for has a hard time cleaning their teeth on their own, you may need to brush and floss their teeth for them. It may be easiest to have the person sit and face away from you, and to sit or stand behind them. That way you can steady their head against your arm as you reach around to floss and brush their teeth. Choose a place that has good lighting and is comfortable for both of you. Before you begin, gather your supplies. You will need gloves, floss, a toothbrush, and a container to hold water if you are not near a sink. Wash and dry your hands well and put on gloves. Start by flossing:  Gently work a piece of floss between each of the teeth toward the gums. A plastic flossing tool may make this easier, and they are available at most [facility]. Curve the floss around each tooth into a U-shape and gently slide it under the gum line. Move the floss firmly up and down several times to scrape off the plaque. After you've finished flossing, throw away the used floss and begin brushing:  Wet the brush and apply toothpaste. Place the brush at a 45-degree angle where the teeth meet the gums. Press firmly, and move the brush in small circles over the surface of the teeth. Be careful not to brush too hard. Vigorous brushing can make the gums pull away from the teeth and can scratch the tooth enamel. Brush all surfaces of the teeth, on the tongue side and on the cheek side.  Pay special attention to the front teeth and all surfaces of the back teeth. Brush chewing surfaces with short back-and-forth strokes. After you've finished, help the person rinse the remaining toothpaste from their mouth. Where can you learn more? Go to https://chmitra.Retrofit. org and sign in to your Tapestry account. Enter W345 in the [employer] box to learn more about \"Learning About Dental Care for Older Adults. \"     If you do not have an account, please click on the \"Sign Up Now\" link. Current as of: June 16, 2022               Content Version: 13.4  © 2006-2022 [employer]. Care instructions adapted under license by [facility] ([facility]). If you have questions about a medical condition or this instruction, always ask your healthcare professional. [address] any warranty or liability for your use of this information. Hearing Loss: Care Instructions  Overview     Hearing loss is a sudden or slow decrease in how well you hear. It can range from mild to severe. Permanent hearing loss can occur with aging. It also can happen when you are exposed long-term to loud noise. Examples include listening to loud music, riding motorcycles, or being around other loud machines. Hearing loss can affect your work and home life. It can make you feel lonely or depressed. You may feel that you have lost your independence. But hearing aids and other devices can help you hear better and feel connected to others. Follow-up care is a key part of your treatment and safety. Be sure to make and go to all appointments, and call your doctor if you are having problems. It's also a good idea to know your test results and keep a list of the medicines you take. How can you care for yourself at home? Avoid loud noises whenever possible. This helps keep your hearing from getting worse. Always wear hearing protection around loud noises. Wear a hearing aid as directed.  See a professional who can help you pick a hearing aid that fits you. Have hearing tests as your doctor suggests. They can show whether your hearing has changed. Your hearing aid may need to be adjusted. Use other devices as needed. These may include:  Telephone amplifiers and hearing aids that can connect to a television, stereo, radio, or microphone. Devices that use lights or vibrations. These alert you to the doorbell, a ringing telephone, or a baby monitor. Television closed-captioning. This shows the words at the bottom of the screen. Most new TVs can do this. TTY (text telephone). This lets you type messages back and forth on the telephone instead of talking or listening. These devices are also called TDD. When messages are typed on the keyboard, they are sent over the phone line to a receiving TTY. The message is shown on a monitor. Use text messaging, social media, and email if it is hard for you to communicate by telephone. Try to learn a listening technique called speechreading. It is not lipreading. You pay attention to people's gestures, expressions, posture, and tone of voice. These clues can help you understand what a person is saying. Face the person you are talking to, and have them face you. Make sure the lighting is good. You need to see the other person's face clearly. Think about counseling if you need help to adjust to your hearing loss. When should you call for help? Watch closely for changes in your health, and be sure to contact your doctor if:    You think your hearing is getting worse.     You have new symptoms, such as dizziness or nausea. Where can you learn more? Go to https://yogesh.Unafinance. org and sign in to your FFWD account. Enter G191 in the [employer] box to learn more about \"Hearing Loss: Care Instructions. \"     If you do not have an account, please click on the \"Sign Up Now\" link. Current as of:  May 4, 2022               Content Version: 13.4  © 2006-2022 Healthwise, fanatix. Care instructions adapted under license by [facility] [address] If you have questions about a medical condition or this instruction, always ask your healthcare professional. [address] any warranty or liability for your use of this information. Learning About Activities of Daily Living  What are activities of daily living? Activities of daily living (ADLs) are the basic self-care tasks you do every day. As you age, and if you have health problems, you may find that it's harder to do these things for yourself. That's when you may need some help. Your doctor uses ADLs to measure how much help you need. Knowing what you can and can't do for yourself is an important first step to getting help. And when you have the help you need, you can stay as independent as possible. Your doctor will want to know if you are able to do tasks such as: Take a bath or shower without help. Go to the bathroom by yourself. Dress and undress without help. Shave, comb your hair, and brush teeth on your own. Get in and out of bed or a chair without help. Feed yourself without help. If you are having trouble doing basic self-care tasks, talk with your doctor. You may want to bring a caregiver or family member who can help the doctor understand your needs and abilities. How will a doctor assess your ADLs? Asking about ADLs is part of a routine health checkup your doctor will likely do as you age. Your health check might be done in a doctor's office, in your home, or at a hospital. The goal is to find out if you are having any problems that could make your health problems worse or that make it unsafe for you to be on your own. To measure your ADLs, your doctor will ask how hard it is for you to do routine tasks. He or she may also want to know if you have changed the way you do a task because of a health problem.  He or she may watch how you:  Walk back and forth.  Keep your balance while you stand or walk. Move from sitting to standing or from a bed to a chair. Button or unbutton a shirt or sweater. Remove and put on your shoes. It's normal to feel a little worried or anxious if you find you can't do all the things you used to be able to do. Talking with your doctor about ADLs isn't a test that you either pass or fail. It's just a way to get more information about your health and safety. Follow-up care is a key part of your treatment and safety. Be sure to make and go to all appointments, and call your doctor if you are having problems. It's also a good idea to know your test results and keep a list of the medicines you take. Current as of: October 6, 2021               Content Version: 13.4  © 2006-2022 Healthwise, ticketstreet. Care instructions adapted under license by [facility] ([facility]). If you have questions about a medical condition or this instruction, always ask your healthcare professional. [place] [address] any warranty or liability for your use of this information. Advance Directives: Care Instructions  Overview  An advance directive is a legal way to state your wishes at the end of your life. It tells your family and your doctor what to do if you can't say what you want. There are two main types of advance directives. You can change them any time your wishes change. Living will. This form tells your family and your doctor your wishes about life support and other treatment. The form is also called a declaration. Medical power of . This form lets you name a person to make treatment decisions for you when you can't speak for yourself. This person is called a health care agent (health care proxy, health care surrogate). The form is also called a durable power of  for health care.   If you do not have an advance directive, decisions about your medical care may be made by a family member, or by a doctor or a  who doesn't know you. It may help to think of an advance directive as a gift to the people who care for you. If you have one, they won't have to make tough decisions by themselves. Follow-up care is a key part of your treatment and safety. Be sure to make and go to all appointments, and call your doctor if you are having problems. It's also a good idea to know your test results and keep a list of the medicines you take. What should you include in an advance directive? Many states have a unique advance directive form. (It may ask you to address specific issues.) Or you might use a universal form that's approved by many states. If your form doesn't tell you what to address, it may be hard to know what to include in your advance directive. Use the questions below to help you get started. Who do you want to make decisions about your medical care if you are not able to? What life-support measures do you want if you have a serious illness that gets worse over time or can't be cured? What are you most afraid of that might happen? (Maybe you're afraid of having pain, losing your independence, or being kept alive by machines.)  Where would you prefer to die? (Your home? A hospital? A nursing home?)  Do you want to donate your organs when you die? Do you want certain Jewish practices performed before you die? When should you call for help? Be sure to contact your doctor if you have any questions. Where can you learn more? Go to [email or web address] and sign in to your LoginRadius account. Enter R264 in the [facility] box to learn more about \"Advance Directives: Care Instructions. \"     If you do not have an account, please click on the \"Sign Up Now\" link. Current as of: June 16, 2022               Content Version: 13.4  © 4805-6372 Healthwise, Incorporated. Care instructions adapted under license by [facility] ([facility]).  If you have questions about a medical condition or this instruction, always ask your healthcare professional. [place] [address] any warranty or liability for your use of this information. Personalized Preventive Plan for Mingo Mclaughlin - 12/7/2022  Medicare offers a range of preventive health benefits. Some of the tests and screenings are paid in full while other may be subject to a deductible, co-insurance, and/or copay. Some of these benefits include a comprehensive review of your medical history including lifestyle, illnesses that may run in your family, and various assessments and screenings as appropriate. After reviewing your medical record and screening and assessments performed today your provider may have ordered immunizations, labs, imaging, and/or referrals for you. A list of these orders (if applicable) as well as your Preventive Care list are included within your After Visit Summary for your review. Other Preventive Recommendations:    A preventive eye exam performed by an eye specialist is recommended every 1-2 years to screen for glaucoma; cataracts, macular degeneration, and other eye disorders. A preventive dental visit is recommended every 6 months. Try to get at least 150 minutes of exercise per week or 10,000 steps per day on a pedometer . Order or download the FREE \"Exercise & Physical Activity: Your Everyday Guide\" from [employer]. Call 5-849.922.7544 or search [employer] online. You need 4561-2790 mg of calcium and 4966-8911 IU of vitamin D per day. It is possible to meet your calcium requirement with diet alone, but a vitamin D supplement is usually necessary to meet this goal.  When exposed to the sun, use a sunscreen that protects against both UVA and UVB radiation with an SPF of 30 or greater. Reapply every 2 to 3 hours or after sweating, drying off with a towel, or swimming. Always wear a seat belt when traveling in a car.  Always wear a helmet when riding a bicycle or motorcycle.

## 2022-12-26 DIAGNOSIS — J44.9 COPD, SEVERE (HCC): ICD-10-CM

## 2022-12-27 RX ORDER — BUDESONIDE AND FORMOTEROL FUMARATE DIHYDRATE 160; 4.5 UG/1; UG/1
AEROSOL RESPIRATORY (INHALATION)
Qty: 10.2 G | Refills: 5 | Status: SHIPPED | OUTPATIENT
Start: 2022-12-27

## 2022-12-27 RX ORDER — AMLODIPINE BESYLATE 10 MG/1
TABLET ORAL
Qty: 90 TABLET | Refills: 0 | Status: SHIPPED | OUTPATIENT
Start: 2022-12-27

## 2022-12-28 ENCOUNTER — HOSPITAL ENCOUNTER (OUTPATIENT)
Dept: VASCULAR LAB | Age: 87
Discharge: HOME | End: 2022-12-28
Payer: MEDICARE

## 2022-12-28 DIAGNOSIS — M79.89 LEFT LEG SWELLING: ICD-10-CM

## 2022-12-28 PROCEDURE — 93971 EXTREMITY STUDY: CPT

## 2023-01-10 NOTE — TELEPHONE ENCOUNTER
Patient Information  Name: Sarah Perdomo  : 1992  SSN: [id number]  Home Phone: [phone]      To whom it may concern       Pt has been in the hospital from 23 until 1/10/23 she should not return to work until 23.       Any questions call   Piper KOTHARI  [phone] Pt daughter called back and scheduled appt for pt on 10/7/20 for IN OFFICE appt. See result notes. Pt daughter says pt refuses O2 set up at this time.

## 2023-06-19 RX ORDER — AMLODIPINE BESYLATE 10 MG/1
TABLET ORAL
Qty: 90 TABLET | Refills: 0 | Status: SHIPPED | OUTPATIENT
Start: 2023-06-19

## 2023-06-23 DIAGNOSIS — J44.9 COPD, SEVERE (HCC): ICD-10-CM

## 2023-06-25 RX ORDER — BUDESONIDE AND FORMOTEROL FUMARATE DIHYDRATE 160; 4.5 UG/1; UG/1
AEROSOL RESPIRATORY (INHALATION)
Qty: 11 G | Refills: 0 | Status: SHIPPED | OUTPATIENT
Start: 2023-06-25

## 2023-06-26 RX ORDER — TIOTROPIUM BROMIDE INHALATION SPRAY 3.12 UG/1
SPRAY, METERED RESPIRATORY (INHALATION)
Qty: 4 G | Refills: 6 | Status: SHIPPED | OUTPATIENT
Start: 2023-06-26

## 2023-07-25 DIAGNOSIS — J44.9 COPD, SEVERE (HCC): ICD-10-CM

## 2023-07-25 RX ORDER — BUDESONIDE AND FORMOTEROL FUMARATE DIHYDRATE 160; 4.5 UG/1; UG/1
2 AEROSOL RESPIRATORY (INHALATION) 2 TIMES DAILY
Qty: 1 EACH | Refills: 5 | Status: SHIPPED | OUTPATIENT
Start: 2023-07-25

## 2023-09-06 ENCOUNTER — OFFICE VISIT (OUTPATIENT)
Dept: INTERNAL MEDICINE CLINIC | Age: 88
End: 2023-09-06

## 2023-09-06 VITALS
BODY MASS INDEX: 20.62 KG/M2 | SYSTOLIC BLOOD PRESSURE: 144 MMHG | HEIGHT: 60 IN | DIASTOLIC BLOOD PRESSURE: 50 MMHG | HEART RATE: 72 BPM | WEIGHT: 105 LBS

## 2023-09-06 DIAGNOSIS — Z23 NEED FOR INFLUENZA VACCINATION: ICD-10-CM

## 2023-09-06 DIAGNOSIS — J44.9 CHRONIC OBSTRUCTIVE PULMONARY DISEASE, UNSPECIFIED COPD TYPE (HCC): ICD-10-CM

## 2023-09-06 DIAGNOSIS — M17.0 OSTEOARTHRITIS OF BOTH KNEES, UNSPECIFIED OSTEOARTHRITIS TYPE: ICD-10-CM

## 2023-09-06 DIAGNOSIS — I10 BENIGN ESSENTIAL HTN: Primary | ICD-10-CM

## 2023-09-06 PROCEDURE — 3023F SPIROM DOC REV: CPT | Performed by: INTERNAL MEDICINE

## 2023-09-06 PROCEDURE — 90662 IIV NO PRSV INCREASED AG IM: CPT | Performed by: INTERNAL MEDICINE

## 2023-09-06 PROCEDURE — G8420 CALC BMI NORM PARAMETERS: HCPCS | Performed by: INTERNAL MEDICINE

## 2023-09-06 PROCEDURE — G8427 DOCREV CUR MEDS BY ELIG CLIN: HCPCS | Performed by: INTERNAL MEDICINE

## 2023-09-06 PROCEDURE — 1123F ACP DISCUSS/DSCN MKR DOCD: CPT | Performed by: INTERNAL MEDICINE

## 2023-09-06 PROCEDURE — 1036F TOBACCO NON-USER: CPT | Performed by: INTERNAL MEDICINE

## 2023-09-06 PROCEDURE — G0008 ADMIN INFLUENZA VIRUS VAC: HCPCS | Performed by: INTERNAL MEDICINE

## 2023-09-06 PROCEDURE — 99214 OFFICE O/P EST MOD 30 MIN: CPT | Performed by: INTERNAL MEDICINE

## 2023-09-06 RX ORDER — AMLODIPINE BESYLATE 5 MG/1
5 TABLET ORAL DAILY
Qty: 90 TABLET | Refills: 1 | Status: SHIPPED | OUTPATIENT
Start: 2023-09-06

## 2023-09-06 RX ORDER — MELOXICAM 7.5 MG/1
7.5 TABLET ORAL DAILY
Qty: 90 TABLET | Refills: 0 | Status: SHIPPED | OUTPATIENT
Start: 2023-09-06

## 2023-09-06 RX ORDER — DIPHENHYDRAMINE HCL 25 MG
1 CAPSULE ORAL 3 TIMES DAILY
Qty: 1 EACH | Refills: 1 | Status: SHIPPED | OUTPATIENT
Start: 2023-09-06

## 2023-09-06 ASSESSMENT — PATIENT HEALTH QUESTIONNAIRE - PHQ9
2. FEELING DOWN, DEPRESSED OR HOPELESS: 0
SUM OF ALL RESPONSES TO PHQ QUESTIONS 1-9: 0
1. LITTLE INTEREST OR PLEASURE IN DOING THINGS: 0
SUM OF ALL RESPONSES TO PHQ9 QUESTIONS 1 & 2: 0
SUM OF ALL RESPONSES TO PHQ QUESTIONS 1-9: 0

## 2023-09-06 ASSESSMENT — ENCOUNTER SYMPTOMS
VOMITING: 0
DIARRHEA: 0
SHORTNESS OF BREATH: 0
CHEST TIGHTNESS: 0
ABDOMINAL PAIN: 0
COUGH: 0
WHEEZING: 0
BLOOD IN STOOL: 0
NAUSEA: 0

## 2023-09-06 NOTE — PROGRESS NOTES
Jose Freedman (:  1930) is a 80 y.o. male,Established patient, here for evaluation of the following chief complaint(s):  Follow-up         ASSESSMENT/PLAN:     Diagnosis Orders   1. Benign essential HTN        2. Chronic obstructive pulmonary disease, unspecified COPD type ([address])        3. Osteoarthritis of both knees, unspecified osteoarthritis type        4. Need for influenza vaccination            HTN. Bp isgood  Continue norvasc 15 mg daily     Anemia- likely iron deficiency. Takes iron pills. Resolved      bronchiectasis  Interstitial lung disease per CXR  Lung nodules- does not want any further work up  [employer] pulmonologist  continue inhalers     OA  Meloxicam 7.5 mg daily   Pepcid prn    Subjective   SUBJECTIVE/OBJECTIVE:  HPI  Is here for follow up of HTN, anemia and OA     Patient's BP is  controlled on current medications. He stopped taking NSAID as he was found to be anemic,he has been having in the knee joints. diclofenac gel helps   Takes iron pills for anemia. patient's family is not keen on him getting a colonoscopy given his age      Sees pulmonologist for bronchiectasis,severe COPD and lung nodules    Review of Systems   Constitutional: Negative. Negative for fatigue and fever. Respiratory:  Negative for cough, chest tightness, shortness of breath and wheezing. Cardiovascular:  Negative for chest pain and palpitations. Gastrointestinal:  Negative for abdominal pain, blood in stool, diarrhea, nausea and vomiting. Objective   Physical Exam  Constitutional:       Appearance: He is well-developed. HENT:      Head: Normocephalic and atraumatic. Eyes:      Pupils: Pupils are equal, round, and reactive to light. Neck:      Thyroid: No thyromegaly. Cardiovascular:      Rate and Rhythm: Normal rate and regular rhythm. Heart sounds: Normal heart sounds. No murmur heard. No friction rub. No gallop.       Comments: No carotid bruit  Pulmonary:      Effort:

## 2023-09-19 RX ORDER — AMLODIPINE BESYLATE 10 MG/1
TABLET ORAL
Qty: 90 TABLET | Refills: 0 | OUTPATIENT
Start: 2023-09-19

## 2023-09-25 RX ORDER — AMLODIPINE BESYLATE 5 MG/1
5 TABLET ORAL DAILY
Qty: 90 TABLET | Refills: 1 | Status: SHIPPED | OUTPATIENT
Start: 2023-09-25

## 2023-10-31 ENCOUNTER — TELEPHONE (OUTPATIENT)
Dept: INTERNAL MEDICINE CLINIC | Age: 88
End: 2023-10-31

## 2023-10-31 RX ORDER — AMLODIPINE BESYLATE 10 MG/1
10 TABLET ORAL DAILY
Qty: 90 TABLET | Refills: 0 | Status: SHIPPED | OUTPATIENT
Start: 2023-10-31

## 2023-10-31 NOTE — TELEPHONE ENCOUNTER
----- Message from [facility] sent at 10/31/2023  1:27 PM EDT -----  Contact: Sandro Delgado [phone]    ----- Message -----  From: Nadja Bentley MD  Sent: 10/31/2023   1:04 PM EDT  To: Ernestina Ferrer   ----- Message -----  From: Reena Hearn  Sent: 10/31/2023  11:10 AM EDT  To: Nadja Bentley MD    Caller pt daughter,  asking if you can call in a 10 mg tablet of amLODIPine (NORVASC) 5 MG tablet so patient will only needs to take 1 tablet instead of 2.  Please advise       Pharmacy    [address] [place] [address] [address], [address] [phone]

## 2023-12-02 ENCOUNTER — TELEPHONE (OUTPATIENT)
Dept: PULMONOLOGY | Age: 88
End: 2023-12-02

## 2023-12-02 NOTE — TELEPHONE ENCOUNTER
LVM for pt to call clinic to get r/s from 12/19/23 with Rich Stevenson.  Informed of cancelling appt

## 2023-12-04 ENCOUNTER — TELEPHONE (OUTPATIENT)
Dept: PULMONOLOGY | Age: 88
End: 2023-12-04

## 2024-01-01 ENCOUNTER — APPOINTMENT (OUTPATIENT)
Dept: CT IMAGING | Age: 88
DRG: 175 | End: 2024-01-01
Payer: MEDICARE

## 2024-01-01 ENCOUNTER — HOSPITAL ENCOUNTER (INPATIENT)
Age: 88
LOS: 2 days | Discharge: HOME | DRG: 175 | End: 2024-12-26
Attending: STUDENT IN AN ORGANIZED HEALTH CARE EDUCATION/TRAINING PROGRAM | Admitting: INTERNAL MEDICINE
Payer: MEDICARE

## 2024-01-01 ENCOUNTER — HOSPITAL ENCOUNTER (EMERGENCY)
Age: 88
End: 2024-12-28
Attending: EMERGENCY MEDICINE
Payer: MEDICARE

## 2024-01-01 ENCOUNTER — APPOINTMENT (OUTPATIENT)
Age: 88
DRG: 175 | End: 2024-01-01
Payer: MEDICARE

## 2024-01-01 ENCOUNTER — APPOINTMENT (OUTPATIENT)
Dept: GENERAL RADIOLOGY | Age: 88
DRG: 175 | End: 2024-01-01
Payer: MEDICARE

## 2024-01-01 VITALS — DIASTOLIC BLOOD PRESSURE: 109 MMHG | SYSTOLIC BLOOD PRESSURE: 138 MMHG | OXYGEN SATURATION: 26 %

## 2024-01-01 VITALS
OXYGEN SATURATION: 96 % | BODY MASS INDEX: 19.44 KG/M2 | TEMPERATURE: 97.9 F | RESPIRATION RATE: 18 BRPM | WEIGHT: 99 LBS | HEART RATE: 68 BPM | SYSTOLIC BLOOD PRESSURE: 138 MMHG | HEIGHT: 60 IN | DIASTOLIC BLOOD PRESSURE: 56 MMHG

## 2024-01-01 DIAGNOSIS — I50.9 ACUTE CONGESTIVE HEART FAILURE, UNSPECIFIED HEART FAILURE TYPE (HCC): ICD-10-CM

## 2024-01-01 DIAGNOSIS — I26.99 ACUTE PULMONARY EMBOLISM WITHOUT ACUTE COR PULMONALE, UNSPECIFIED PULMONARY EMBOLISM TYPE (HCC): ICD-10-CM

## 2024-01-01 DIAGNOSIS — R79.89 ELEVATED TROPONIN: ICD-10-CM

## 2024-01-01 DIAGNOSIS — J44.9 CHRONIC OBSTRUCTIVE PULMONARY DISEASE, UNSPECIFIED COPD TYPE (HCC): ICD-10-CM

## 2024-01-01 DIAGNOSIS — J96.01 ACUTE RESPIRATORY FAILURE WITH HYPOXIA: Primary | ICD-10-CM

## 2024-01-01 DIAGNOSIS — E87.6 HYPOKALEMIA: ICD-10-CM

## 2024-01-01 DIAGNOSIS — J81.0 ACUTE PULMONARY EDEMA: ICD-10-CM

## 2024-01-01 DIAGNOSIS — R09.02 HYPOXEMIA: ICD-10-CM

## 2024-01-01 LAB
ALBUMIN SERPL-MCNC: 2.4 G/DL (ref 3.4–5)
ALBUMIN SERPL-MCNC: 2.5 G/DL (ref 3.4–5)
ALBUMIN SERPL-MCNC: 3.1 G/DL (ref 3.4–5)
ALBUMIN/GLOB SERPL: 0.8 {RATIO} (ref 1.1–2.2)
ALBUMIN/GLOB SERPL: 0.8 {RATIO} (ref 1.1–2.2)
ALBUMIN/GLOB SERPL: 0.9 {RATIO} (ref 1.1–2.2)
ALP SERPL-CCNC: 113 U/L (ref 40–129)
ALP SERPL-CCNC: 116 U/L (ref 40–129)
ALP SERPL-CCNC: 160 U/L (ref 40–129)
ALT SERPL-CCNC: 11 U/L (ref 10–40)
ALT SERPL-CCNC: 12 U/L (ref 10–40)
ALT SERPL-CCNC: 21 U/L (ref 10–40)
ANION GAP SERPL CALCULATED.3IONS-SCNC: 14 MMOL/L (ref 3–16)
ANION GAP SERPL CALCULATED.3IONS-SCNC: 7 MMOL/L (ref 3–16)
ANION GAP SERPL CALCULATED.3IONS-SCNC: 8 MMOL/L (ref 3–16)
ANTI-XA UNFRAC HEPARIN: 0.27 IU/ML (ref 0.3–0.7)
ANTI-XA UNFRAC HEPARIN: 0.41 IU/ML (ref 0.3–0.7)
APTT BLD: 31.1 SEC (ref 22.1–36.4)
AST SERPL-CCNC: 29 U/L (ref 15–37)
AST SERPL-CCNC: 36 U/L (ref 15–37)
AST SERPL-CCNC: 78 U/L (ref 15–37)
BASE EXCESS BLDV CALC-SCNC: -3.9 MMOL/L (ref -3–3)
BASOPHILS # BLD: 0 K/UL (ref 0–0.2)
BASOPHILS # BLD: 0 K/UL (ref 0–0.2)
BASOPHILS # BLD: 0.1 K/UL (ref 0–0.2)
BASOPHILS NFR BLD: 0.3 %
BASOPHILS NFR BLD: 0.3 %
BASOPHILS NFR BLD: 0.6 %
BILIRUB SERPL-MCNC: 0.3 MG/DL (ref 0–1)
BILIRUB SERPL-MCNC: 0.4 MG/DL (ref 0–1)
BILIRUB SERPL-MCNC: 0.9 MG/DL (ref 0–1)
BUN SERPL-MCNC: 21 MG/DL (ref 7–20)
BUN SERPL-MCNC: 21 MG/DL (ref 7–20)
BUN SERPL-MCNC: 22 MG/DL (ref 7–20)
CALCIUM SERPL-MCNC: 7.3 MG/DL (ref 8.3–10.6)
CALCIUM SERPL-MCNC: 7.4 MG/DL (ref 8.3–10.6)
CALCIUM SERPL-MCNC: 7.7 MG/DL (ref 8.3–10.6)
CHLORIDE SERPL-SCNC: 107 MMOL/L (ref 99–110)
CHLORIDE SERPL-SCNC: 107 MMOL/L (ref 99–110)
CHLORIDE SERPL-SCNC: 108 MMOL/L (ref 99–110)
CO2 BLDV-SCNC: 22 MMOL/L
CO2 SERPL-SCNC: 22 MMOL/L (ref 21–32)
CO2 SERPL-SCNC: 27 MMOL/L (ref 21–32)
CO2 SERPL-SCNC: 28 MMOL/L (ref 21–32)
COHGB MFR BLDV: 1.9 % (ref 0–1.5)
CREAT SERPL-MCNC: 1 MG/DL (ref 0.8–1.3)
CREAT SERPL-MCNC: 1.1 MG/DL (ref 0.8–1.3)
CREAT SERPL-MCNC: 1.1 MG/DL (ref 0.8–1.3)
DEPRECATED RDW RBC AUTO: 15.9 % (ref 12.4–15.4)
DEPRECATED RDW RBC AUTO: 15.9 % (ref 12.4–15.4)
DEPRECATED RDW RBC AUTO: 16.4 % (ref 12.4–15.4)
ECHO AO ROOT DIAM: 3.6 CM
ECHO AO ROOT INDEX: 2.71 CM/M2
ECHO AV CUSP MM: 2 CM
ECHO AV PEAK GRADIENT: 7 MMHG
ECHO AV PEAK VELOCITY: 1.3 M/S
ECHO BSA: 1.34 M2
ECHO EST RA PRESSURE: 8 MMHG
ECHO LA AREA 2C: 28.9 CM2
ECHO LA AREA 4C: 24.7 CM2
ECHO LA DIAMETER INDEX: 3.91 CM/M2
ECHO LA DIAMETER: 5.2 CM
ECHO LA MAJOR AXIS: 6 CM
ECHO LA MINOR AXIS: 6.4 CM
ECHO LA TO AORTIC ROOT RATIO: 1.44
ECHO LA VOL BP: 94 ML (ref 18–58)
ECHO LA VOL MOD A2C: 104 ML (ref 18–58)
ECHO LA VOL MOD A4C: 80 ML (ref 18–58)
ECHO LA VOL/BSA BIPLANE: 71 ML/M2 (ref 16–34)
ECHO LA VOLUME INDEX MOD A2C: 78 ML/M2 (ref 16–34)
ECHO LA VOLUME INDEX MOD A4C: 60 ML/M2 (ref 16–34)
ECHO LV E' LATERAL VELOCITY: 6.64 CM/S
ECHO LV E' SEPTAL VELOCITY: 3.37 CM/S
ECHO LV EF PHYSICIAN: 55 %
ECHO LV FRACTIONAL SHORTENING: 22 % (ref 28–44)
ECHO LV INTERNAL DIMENSION DIASTOLE INDEX: 3.08 CM/M2
ECHO LV INTERNAL DIMENSION DIASTOLIC: 4.1 CM (ref 4.2–5.9)
ECHO LV INTERNAL DIMENSION SYSTOLIC INDEX: 2.41 CM/M2
ECHO LV INTERNAL DIMENSION SYSTOLIC: 3.2 CM
ECHO LV IVSD: 1.5 CM (ref 0.6–1)
ECHO LV MASS 2D: 228.6 G (ref 88–224)
ECHO LV MASS INDEX 2D: 171.9 G/M2 (ref 49–115)
ECHO LV POSTERIOR WALL DIASTOLIC: 1.4 CM (ref 0.6–1)
ECHO LV RELATIVE WALL THICKNESS RATIO: 0.68
ECHO MV A VELOCITY: 0.78 M/S
ECHO MV E VELOCITY: 0.57 M/S
ECHO MV E/A RATIO: 0.73
ECHO MV E/E' LATERAL: 8.58
ECHO MV E/E' RATIO (AVERAGED): 12.75
ECHO MV E/E' SEPTAL: 16.91
ECHO PV MAX VELOCITY: 1.1 M/S
ECHO PV PEAK GRADIENT: 4 MMHG
ECHO RA AREA 4C: 15.5 CM2
ECHO RA END SYSTOLIC VOLUME APICAL 4 CHAMBER INDEX BSA: 29 ML/M2
ECHO RA VOLUME: 38 ML
ECHO RIGHT VENTRICULAR SYSTOLIC PRESSURE (RVSP): 63 MMHG
ECHO RV BASAL DIMENSION: 4.2 CM
ECHO RV FREE WALL PEAK S': 13.7 CM/S
ECHO RV LONGITUDINAL DIMENSION: 8.7 CM
ECHO RV MID DIMENSION: 3.3 CM
ECHO RV TAPSE: 2.2 CM (ref 1.7–?)
ECHO TV PEAK GRADIENT: 1 MMHG
ECHO TV REGURGITANT MAX VELOCITY: 3.72 M/S
ECHO TV REGURGITANT PEAK GRADIENT: 55 MMHG
EKG ATRIAL RATE: 70 BPM
EKG ATRIAL RATE: 72 BPM
EKG DIAGNOSIS: NORMAL
EKG DIAGNOSIS: NORMAL
EKG P AXIS: 19 DEGREES
EKG P AXIS: 55 DEGREES
EKG P-R INTERVAL: 160 MS
EKG P-R INTERVAL: 186 MS
EKG Q-T INTERVAL: 438 MS
EKG Q-T INTERVAL: 452 MS
EKG QRS DURATION: 142 MS
EKG QRS DURATION: 148 MS
EKG QTC CALCULATION (BAZETT): 479 MS
EKG QTC CALCULATION (BAZETT): 488 MS
EKG R AXIS: -31 DEGREES
EKG R AXIS: 4 DEGREES
EKG T AXIS: 4 DEGREES
EKG T AXIS: 44 DEGREES
EKG VENTRICULAR RATE: 70 BPM
EKG VENTRICULAR RATE: 72 BPM
EOSINOPHIL # BLD: 0 K/UL (ref 0–0.6)
EOSINOPHIL # BLD: 0.1 K/UL (ref 0–0.6)
EOSINOPHIL # BLD: 0.2 K/UL (ref 0–0.6)
EOSINOPHIL NFR BLD: 0.4 %
EOSINOPHIL NFR BLD: 1.3 %
EOSINOPHIL NFR BLD: 2.5 %
FERRITIN SERPL IA-MCNC: 90.5 NG/ML (ref 30–400)
FLUAV RNA RESP QL NAA+PROBE: NOT DETECTED
FLUBV RNA RESP QL NAA+PROBE: NOT DETECTED
GFR SERPLBLD CREATININE-BSD FMLA CKD-EPI: 62 ML/MIN/{1.73_M2}
GFR SERPLBLD CREATININE-BSD FMLA CKD-EPI: 62 ML/MIN/{1.73_M2}
GFR SERPLBLD CREATININE-BSD FMLA CKD-EPI: 70 ML/MIN/{1.73_M2}
GLUCOSE BLD-MCNC: 114 MG/DL (ref 70–99)
GLUCOSE SERPL-MCNC: 70 MG/DL (ref 70–99)
GLUCOSE SERPL-MCNC: 81 MG/DL (ref 70–99)
GLUCOSE SERPL-MCNC: 81 MG/DL (ref 70–99)
HCO3 BLDV-SCNC: 21 MMOL/L (ref 23–29)
HCT VFR BLD AUTO: 26.1 % (ref 40.5–52.5)
HCT VFR BLD AUTO: 28.4 % (ref 40.5–52.5)
HCT VFR BLD AUTO: 32.3 % (ref 40.5–52.5)
HGB BLD-MCNC: 10.8 G/DL (ref 13.5–17.5)
HGB BLD-MCNC: 8.7 G/DL (ref 13.5–17.5)
HGB BLD-MCNC: 9.5 G/DL (ref 13.5–17.5)
IRON SATN MFR SERPL: 19 % (ref 20–50)
IRON SERPL-MCNC: 32 UG/DL (ref 59–158)
LACTATE BLDV-SCNC: 1.7 MMOL/L (ref 0.4–1.9)
LACTATE BLDV-SCNC: 2.1 MMOL/L (ref 0.4–1.9)
LYMPHOCYTES # BLD: 0.9 K/UL (ref 1–5.1)
LYMPHOCYTES # BLD: 0.9 K/UL (ref 1–5.1)
LYMPHOCYTES # BLD: 1.1 K/UL (ref 1–5.1)
LYMPHOCYTES NFR BLD: 10.5 %
LYMPHOCYTES NFR BLD: 10.8 %
LYMPHOCYTES NFR BLD: 12.1 %
MAGNESIUM SERPL-MCNC: 1.88 MG/DL (ref 1.8–2.4)
MAGNESIUM SERPL-MCNC: 2 MG/DL (ref 1.8–2.4)
MCH RBC QN AUTO: 31.7 PG (ref 26–34)
MCH RBC QN AUTO: 31.9 PG (ref 26–34)
MCH RBC QN AUTO: 31.9 PG (ref 26–34)
MCHC RBC AUTO-ENTMCNC: 33.4 G/DL (ref 31–36)
MCHC RBC AUTO-ENTMCNC: 33.5 G/DL (ref 31–36)
MCHC RBC AUTO-ENTMCNC: 33.5 G/DL (ref 31–36)
MCV RBC AUTO: 95.1 FL (ref 80–100)
MCV RBC AUTO: 95.1 FL (ref 80–100)
MCV RBC AUTO: 95.4 FL (ref 80–100)
METHGB MFR BLDV: 0.3 %
MONOCYTES # BLD: 0.2 K/UL (ref 0–1.3)
MONOCYTES NFR BLD: 1.9 %
MONOCYTES NFR BLD: 2.2 %
MONOCYTES NFR BLD: 2.3 %
NEUTROPHILS # BLD: 6.9 K/UL (ref 1.7–7.7)
NEUTROPHILS # BLD: 7 K/UL (ref 1.7–7.7)
NEUTROPHILS # BLD: 7.8 K/UL (ref 1.7–7.7)
NEUTROPHILS NFR BLD: 84.2 %
NEUTROPHILS NFR BLD: 85.3 %
NEUTROPHILS NFR BLD: 85.3 %
NT-PROBNP SERPL-MCNC: ABNORMAL PG/ML (ref 0–449)
NT-PROBNP SERPL-MCNC: ABNORMAL PG/ML (ref 0–449)
O2 CT VFR BLDV CALC: 13 VOL %
O2 THERAPY: ABNORMAL
PCO2 BLDV: 37.6 MMHG (ref 40–50)
PERFORMED ON: ABNORMAL
PH BLDV: 7.37 [PH] (ref 7.35–7.45)
PLATELET # BLD AUTO: 266 K/UL (ref 135–450)
PLATELET # BLD AUTO: 273 K/UL (ref 135–450)
PLATELET # BLD AUTO: 338 K/UL (ref 135–450)
PLATELET BLD QL SMEAR: ADEQUATE
PMV BLD AUTO: 9.5 FL (ref 5–10.5)
PMV BLD AUTO: 9.7 FL (ref 5–10.5)
PMV BLD AUTO: 9.8 FL (ref 5–10.5)
PO2 BLDV: 45.9 MMHG (ref 25–40)
POTASSIUM SERPL-SCNC: 3.1 MMOL/L (ref 3.5–5.1)
POTASSIUM SERPL-SCNC: 3.3 MMOL/L (ref 3.5–5.1)
POTASSIUM SERPL-SCNC: 3.4 MMOL/L (ref 3.5–5.1)
PROT SERPL-MCNC: 5.3 G/DL (ref 6.4–8.2)
PROT SERPL-MCNC: 5.3 G/DL (ref 6.4–8.2)
PROT SERPL-MCNC: 7.1 G/DL (ref 6.4–8.2)
RBC # BLD AUTO: 2.74 M/UL (ref 4.2–5.9)
RBC # BLD AUTO: 2.98 M/UL (ref 4.2–5.9)
RBC # BLD AUTO: 3.39 M/UL (ref 4.2–5.9)
SAO2 % BLDV: 81 %
SARS-COV-2 RNA RESP QL NAA+PROBE: NOT DETECTED
SLIDE REVIEW: ABNORMAL
SODIUM SERPL-SCNC: 142 MMOL/L (ref 136–145)
SODIUM SERPL-SCNC: 143 MMOL/L (ref 136–145)
SODIUM SERPL-SCNC: 143 MMOL/L (ref 136–145)
TIBC SERPL-MCNC: 170 UG/DL (ref 260–445)
TROPONIN, HIGH SENSITIVITY: 132 NG/L (ref 0–22)
TROPONIN, HIGH SENSITIVITY: 133 NG/L (ref 0–22)
TSH SERPL DL<=0.005 MIU/L-ACNC: 3.79 UIU/ML (ref 0.27–4.2)
WBC # BLD AUTO: 8.2 K/UL (ref 4–11)
WBC # BLD AUTO: 8.3 K/UL (ref 4–11)
WBC # BLD AUTO: 9.1 K/UL (ref 4–11)

## 2024-01-01 PROCEDURE — 80053 COMPREHEN METABOLIC PANEL: CPT

## 2024-01-01 PROCEDURE — 2500000003 HC RX 250 WO HCPCS: Performed by: NURSE PRACTITIONER

## 2024-01-01 PROCEDURE — 99223 1ST HOSP IP/OBS HIGH 75: CPT | Performed by: NURSE PRACTITIONER

## 2024-01-01 PROCEDURE — 83550 IRON BINDING TEST: CPT

## 2024-01-01 PROCEDURE — 99239 HOSP IP/OBS DSCHRG MGMT >30: CPT | Performed by: INTERNAL MEDICINE

## 2024-01-01 PROCEDURE — 99223 1ST HOSP IP/OBS HIGH 75: CPT | Performed by: INTERNAL MEDICINE

## 2024-01-01 PROCEDURE — 93005 ELECTROCARDIOGRAM TRACING: CPT | Performed by: EMERGENCY MEDICINE

## 2024-01-01 PROCEDURE — 96374 THER/PROPH/DIAG INJ IV PUSH: CPT

## 2024-01-01 PROCEDURE — 83880 ASSAY OF NATRIURETIC PEPTIDE: CPT

## 2024-01-01 PROCEDURE — 94640 AIRWAY INHALATION TREATMENT: CPT

## 2024-01-01 PROCEDURE — 99233 SBSQ HOSP IP/OBS HIGH 50: CPT | Performed by: INTERNAL MEDICINE

## 2024-01-01 PROCEDURE — 83735 ASSAY OF MAGNESIUM: CPT

## 2024-01-01 PROCEDURE — 6360000002 HC RX W HCPCS

## 2024-01-01 PROCEDURE — 99232 SBSQ HOSP IP/OBS MODERATE 35: CPT | Performed by: INTERNAL MEDICINE

## 2024-01-01 PROCEDURE — 36415 COLL VENOUS BLD VENIPUNCTURE: CPT

## 2024-01-01 PROCEDURE — 84443 ASSAY THYROID STIM HORMONE: CPT

## 2024-01-01 PROCEDURE — 2700000000 HC OXYGEN THERAPY PER DAY

## 2024-01-01 PROCEDURE — 85025 COMPLETE CBC W/AUTO DIFF WBC: CPT

## 2024-01-01 PROCEDURE — 85730 THROMBOPLASTIN TIME PARTIAL: CPT

## 2024-01-01 PROCEDURE — 6360000002 HC RX W HCPCS: Performed by: NURSE PRACTITIONER

## 2024-01-01 PROCEDURE — 93010 ELECTROCARDIOGRAM REPORT: CPT | Performed by: INTERNAL MEDICINE

## 2024-01-01 PROCEDURE — 94761 N-INVAS EAR/PLS OXIMETRY MLT: CPT

## 2024-01-01 PROCEDURE — 6370000000 HC RX 637 (ALT 250 FOR IP): Performed by: INTERNAL MEDICINE

## 2024-01-01 PROCEDURE — 87636 SARSCOV2 & INF A&B AMP PRB: CPT

## 2024-01-01 PROCEDURE — 71260 CT THORAX DX C+: CPT

## 2024-01-01 PROCEDURE — 71045 X-RAY EXAM CHEST 1 VIEW: CPT

## 2024-01-01 PROCEDURE — 93306 TTE W/DOPPLER COMPLETE: CPT

## 2024-01-01 PROCEDURE — 2060000000 HC ICU INTERMEDIATE R&B

## 2024-01-01 PROCEDURE — 6370000000 HC RX 637 (ALT 250 FOR IP): Performed by: NURSE PRACTITIONER

## 2024-01-01 PROCEDURE — 6360000002 HC RX W HCPCS: Performed by: EMERGENCY MEDICINE

## 2024-01-01 PROCEDURE — 85520 HEPARIN ASSAY: CPT

## 2024-01-01 PROCEDURE — 6360000002 HC RX W HCPCS: Performed by: INTERNAL MEDICINE

## 2024-01-01 PROCEDURE — 83605 ASSAY OF LACTIC ACID: CPT

## 2024-01-01 PROCEDURE — 93005 ELECTROCARDIOGRAM TRACING: CPT | Performed by: STUDENT IN AN ORGANIZED HEALTH CARE EDUCATION/TRAINING PROGRAM

## 2024-01-01 PROCEDURE — 99285 EMERGENCY DEPT VISIT HI MDM: CPT

## 2024-01-01 PROCEDURE — 84484 ASSAY OF TROPONIN QUANT: CPT

## 2024-01-01 PROCEDURE — 82728 ASSAY OF FERRITIN: CPT

## 2024-01-01 PROCEDURE — 93306 TTE W/DOPPLER COMPLETE: CPT | Performed by: INTERNAL MEDICINE

## 2024-01-01 PROCEDURE — 83540 ASSAY OF IRON: CPT

## 2024-01-01 PROCEDURE — 6360000004 HC RX CONTRAST MEDICATION: Performed by: NURSE PRACTITIONER

## 2024-01-01 PROCEDURE — 96375 TX/PRO/DX INJ NEW DRUG ADDON: CPT

## 2024-01-01 PROCEDURE — 82803 BLOOD GASES ANY COMBINATION: CPT

## 2024-01-01 RX ORDER — POTASSIUM CHLORIDE 750 MG/1
10 TABLET, EXTENDED RELEASE ORAL DAILY
Qty: 30 TABLET | Refills: 5 | Status: SHIPPED | OUTPATIENT
Start: 2024-01-01 | End: 2024-12-29

## 2024-01-01 RX ORDER — ASPIRIN 81 MG/1
81 TABLET ORAL DAILY
Status: DISCONTINUED | OUTPATIENT
Start: 2024-01-01 | End: 2024-01-01 | Stop reason: HOSPADM

## 2024-01-01 RX ORDER — CARBOXYMETHYLCELLULOSE SODIUM 10 MG/ML
1 GEL OPHTHALMIC EVERY 4 HOURS PRN
Status: DISCONTINUED | OUTPATIENT
Start: 2024-01-01 | End: 2024-01-01 | Stop reason: HOSPADM

## 2024-01-01 RX ORDER — SODIUM CHLORIDE 9 MG/ML
INJECTION, SOLUTION INTRAVENOUS PRN
Status: DISCONTINUED | OUTPATIENT
Start: 2024-01-01 | End: 2024-01-01 | Stop reason: HOSPADM

## 2024-01-01 RX ORDER — ALBUTEROL SULFATE 90 UG/1
2 INHALANT RESPIRATORY (INHALATION) EVERY 6 HOURS PRN
Status: DISCONTINUED | OUTPATIENT
Start: 2024-01-01 | End: 2024-01-01 | Stop reason: HOSPADM

## 2024-01-01 RX ORDER — SODIUM CHLORIDE 0.9 % (FLUSH) 0.9 %
5-40 SYRINGE (ML) INJECTION EVERY 12 HOURS SCHEDULED
Status: DISCONTINUED | OUTPATIENT
Start: 2024-01-01 | End: 2024-01-01 | Stop reason: HOSPADM

## 2024-01-01 RX ORDER — ATORVASTATIN CALCIUM 40 MG/1
40 TABLET, FILM COATED ORAL NIGHTLY
Status: DISCONTINUED | OUTPATIENT
Start: 2024-01-01 | End: 2024-01-01 | Stop reason: HOSPADM

## 2024-01-01 RX ORDER — ACETAMINOPHEN 650 MG/1
650 SUPPOSITORY RECTAL EVERY 6 HOURS PRN
Status: DISCONTINUED | OUTPATIENT
Start: 2024-01-01 | End: 2024-01-01 | Stop reason: HOSPADM

## 2024-01-01 RX ORDER — CARBOXYMETHYLCELLULOSE SODIUM 10 MG/ML
1 GEL OPHTHALMIC 3 TIMES DAILY
Status: DISCONTINUED | OUTPATIENT
Start: 2024-01-01 | End: 2024-01-01 | Stop reason: HOSPADM

## 2024-01-01 RX ORDER — FUROSEMIDE 10 MG/ML
INJECTION INTRAMUSCULAR; INTRAVENOUS
Status: COMPLETED
Start: 2024-01-01 | End: 2024-01-01

## 2024-01-01 RX ORDER — HEPARIN SODIUM 1000 [USP'U]/ML
80 INJECTION, SOLUTION INTRAVENOUS; SUBCUTANEOUS PRN
Status: DISCONTINUED | OUTPATIENT
Start: 2024-01-01 | End: 2024-01-01 | Stop reason: HOSPADM

## 2024-01-01 RX ORDER — POTASSIUM CHLORIDE 7.45 MG/ML
10 INJECTION INTRAVENOUS ONCE
Status: COMPLETED | OUTPATIENT
Start: 2024-01-01 | End: 2024-01-01

## 2024-01-01 RX ORDER — HEPARIN SODIUM 1000 [USP'U]/ML
40 INJECTION, SOLUTION INTRAVENOUS; SUBCUTANEOUS PRN
Status: DISCONTINUED | OUTPATIENT
Start: 2024-01-01 | End: 2024-01-01 | Stop reason: HOSPADM

## 2024-01-01 RX ORDER — HEPARIN SODIUM 1000 [USP'U]/ML
80 INJECTION, SOLUTION INTRAVENOUS; SUBCUTANEOUS ONCE
Status: COMPLETED | OUTPATIENT
Start: 2024-01-01 | End: 2024-01-01

## 2024-01-01 RX ORDER — FUROSEMIDE 40 MG/1
40 TABLET ORAL DAILY
Qty: 30 TABLET | Refills: 1 | Status: SHIPPED | OUTPATIENT
Start: 2024-01-01 | End: 2024-12-29

## 2024-01-01 RX ORDER — POTASSIUM CHLORIDE 1500 MG/1
20 TABLET, EXTENDED RELEASE ORAL ONCE
Status: COMPLETED | OUTPATIENT
Start: 2024-01-01 | End: 2024-01-01

## 2024-01-01 RX ORDER — POTASSIUM CHLORIDE 1500 MG/1
40 TABLET, EXTENDED RELEASE ORAL ONCE
Status: COMPLETED | OUTPATIENT
Start: 2024-01-01 | End: 2024-01-01

## 2024-01-01 RX ORDER — LORAZEPAM 2 MG/ML
0.5 INJECTION INTRAMUSCULAR ONCE
Status: COMPLETED | OUTPATIENT
Start: 2024-01-01 | End: 2024-01-01

## 2024-01-01 RX ORDER — SODIUM CHLORIDE 0.9 % (FLUSH) 0.9 %
5-40 SYRINGE (ML) INJECTION PRN
Status: DISCONTINUED | OUTPATIENT
Start: 2024-01-01 | End: 2024-01-01 | Stop reason: HOSPADM

## 2024-01-01 RX ORDER — ONDANSETRON 4 MG/1
4 TABLET, ORALLY DISINTEGRATING ORAL EVERY 8 HOURS PRN
Status: DISCONTINUED | OUTPATIENT
Start: 2024-01-01 | End: 2024-01-01 | Stop reason: HOSPADM

## 2024-01-01 RX ORDER — FUROSEMIDE 10 MG/ML
40 INJECTION INTRAMUSCULAR; INTRAVENOUS ONCE
Status: COMPLETED | OUTPATIENT
Start: 2024-01-01 | End: 2024-01-01

## 2024-01-01 RX ORDER — HEPARIN SODIUM 1000 [USP'U]/ML
40 INJECTION, SOLUTION INTRAVENOUS; SUBCUTANEOUS ONCE
Status: COMPLETED | OUTPATIENT
Start: 2024-01-01 | End: 2024-01-01

## 2024-01-01 RX ORDER — POLYETHYLENE GLYCOL 3350 17 G/17G
17 POWDER, FOR SOLUTION ORAL DAILY PRN
Status: DISCONTINUED | OUTPATIENT
Start: 2024-01-01 | End: 2024-01-01 | Stop reason: HOSPADM

## 2024-01-01 RX ORDER — ONDANSETRON 2 MG/ML
4 INJECTION INTRAMUSCULAR; INTRAVENOUS EVERY 6 HOURS PRN
Status: DISCONTINUED | OUTPATIENT
Start: 2024-01-01 | End: 2024-01-01 | Stop reason: HOSPADM

## 2024-01-01 RX ORDER — POTASSIUM CHLORIDE 1500 MG/1
40 TABLET, EXTENDED RELEASE ORAL
Status: DISCONTINUED | OUTPATIENT
Start: 2024-01-01 | End: 2024-01-01

## 2024-01-01 RX ORDER — FUROSEMIDE 10 MG/ML
40 INJECTION INTRAMUSCULAR; INTRAVENOUS 2 TIMES DAILY
Status: DISCONTINUED | OUTPATIENT
Start: 2024-01-01 | End: 2024-01-01

## 2024-01-01 RX ORDER — BUDESONIDE AND FORMOTEROL FUMARATE DIHYDRATE 160; 4.5 UG/1; UG/1
2 AEROSOL RESPIRATORY (INHALATION)
Status: DISCONTINUED | OUTPATIENT
Start: 2024-01-01 | End: 2024-01-01 | Stop reason: HOSPADM

## 2024-01-01 RX ORDER — ATORVASTATIN CALCIUM 40 MG/1
40 TABLET, FILM COATED ORAL NIGHTLY
Qty: 30 TABLET | Refills: 3 | Status: SHIPPED | OUTPATIENT
Start: 2024-01-01 | End: 2024-12-29

## 2024-01-01 RX ORDER — IOPAMIDOL 755 MG/ML
75 INJECTION, SOLUTION INTRAVASCULAR
Status: COMPLETED | OUTPATIENT
Start: 2024-01-01 | End: 2024-01-01

## 2024-01-01 RX ORDER — HEPARIN SODIUM 10000 [USP'U]/100ML
20 INJECTION, SOLUTION INTRAVENOUS CONTINUOUS
Status: DISCONTINUED | OUTPATIENT
Start: 2024-01-01 | End: 2024-01-01

## 2024-01-01 RX ORDER — ASPIRIN 325 MG
325 TABLET ORAL ONCE
Status: COMPLETED | OUTPATIENT
Start: 2024-01-01 | End: 2024-01-01

## 2024-01-01 RX ORDER — FUROSEMIDE 10 MG/ML
40 INJECTION INTRAMUSCULAR; INTRAVENOUS DAILY
Status: DISCONTINUED | OUTPATIENT
Start: 2024-01-01 | End: 2024-01-01 | Stop reason: HOSPADM

## 2024-01-01 RX ORDER — ACETAMINOPHEN 325 MG/1
650 TABLET ORAL EVERY 6 HOURS PRN
Status: DISCONTINUED | OUTPATIENT
Start: 2024-01-01 | End: 2024-01-01 | Stop reason: HOSPADM

## 2024-01-01 RX ORDER — AMLODIPINE BESYLATE 5 MG/1
10 TABLET ORAL DAILY
Status: DISCONTINUED | OUTPATIENT
Start: 2024-01-01 | End: 2024-01-01 | Stop reason: HOSPADM

## 2024-01-01 RX ADMIN — ATORVASTATIN CALCIUM 40 MG: 40 TABLET, FILM COATED ORAL at 20:21

## 2024-01-01 RX ADMIN — Medication 10 ML: at 08:47

## 2024-01-01 RX ADMIN — Medication 10 ML: at 20:21

## 2024-01-01 RX ADMIN — CARBOXYMETHYLCELLULOSE SODIUM 1 DROP: 10 GEL OPHTHALMIC at 20:26

## 2024-01-01 RX ADMIN — POTASSIUM CHLORIDE 40 MEQ: 1500 TABLET, EXTENDED RELEASE ORAL at 16:06

## 2024-01-01 RX ADMIN — DICLOFENAC SODIUM 4 G: 10 GEL TOPICAL at 13:00

## 2024-01-01 RX ADMIN — APIXABAN 10 MG: 5 TABLET, FILM COATED ORAL at 09:45

## 2024-01-01 RX ADMIN — FUROSEMIDE 40 MG: 10 INJECTION, SOLUTION INTRAMUSCULAR; INTRAVENOUS at 14:12

## 2024-01-01 RX ADMIN — ASPIRIN 81 MG: 81 TABLET, COATED ORAL at 08:47

## 2024-01-01 RX ADMIN — DICLOFENAC SODIUM 4 G: 10 GEL TOPICAL at 08:51

## 2024-01-01 RX ADMIN — TIOTROPIUM BROMIDE INHALATION SPRAY 2 PUFF: 3.12 SPRAY, METERED RESPIRATORY (INHALATION) at 07:06

## 2024-01-01 RX ADMIN — ASPIRIN 81 MG: 81 TABLET, COATED ORAL at 16:06

## 2024-01-01 RX ADMIN — HEPARIN SODIUM 18 UNITS/KG/HR: 10000 INJECTION, SOLUTION INTRAVENOUS at 14:53

## 2024-01-01 RX ADMIN — FUROSEMIDE 40 MG: 10 INJECTION, SOLUTION INTRAMUSCULAR; INTRAVENOUS at 09:04

## 2024-01-01 RX ADMIN — ASPIRIN 325 MG: 325 TABLET ORAL at 13:26

## 2024-01-01 RX ADMIN — FUROSEMIDE 40 MG: 10 INJECTION, SOLUTION INTRAMUSCULAR; INTRAVENOUS at 16:26

## 2024-01-01 RX ADMIN — DICLOFENAC SODIUM 4 G: 10 GEL TOPICAL at 16:07

## 2024-01-01 RX ADMIN — BUDESONIDE AND FORMOTEROL FUMARATE DIHYDRATE 2 PUFF: 160; 4.5 AEROSOL RESPIRATORY (INHALATION) at 19:04

## 2024-01-01 RX ADMIN — APIXABAN 10 MG: 5 TABLET, FILM COATED ORAL at 20:21

## 2024-01-01 RX ADMIN — POTASSIUM CHLORIDE 10 MEQ: 7.45 INJECTION INTRAVENOUS at 14:52

## 2024-01-01 RX ADMIN — LORAZEPAM 0.5 MG: 2 INJECTION INTRAMUSCULAR; INTRAVENOUS at 08:38

## 2024-01-01 RX ADMIN — POTASSIUM CHLORIDE 40 MEQ: 1500 TABLET, EXTENDED RELEASE ORAL at 11:55

## 2024-01-01 RX ADMIN — FUROSEMIDE: 10 INJECTION, SOLUTION INTRAMUSCULAR; INTRAVENOUS at 11:57

## 2024-01-01 RX ADMIN — IOPAMIDOL 75 ML: 755 INJECTION, SOLUTION INTRAVENOUS at 13:36

## 2024-01-01 RX ADMIN — AMLODIPINE BESYLATE 10 MG: 5 TABLET ORAL at 09:04

## 2024-01-01 RX ADMIN — POTASSIUM CHLORIDE 20 MEQ: 1500 TABLET, EXTENDED RELEASE ORAL at 09:18

## 2024-01-01 RX ADMIN — AMLODIPINE BESYLATE 10 MG: 5 TABLET ORAL at 16:26

## 2024-01-01 RX ADMIN — POTASSIUM CHLORIDE 20 MEQ: 1500 TABLET, EXTENDED RELEASE ORAL at 20:26

## 2024-01-01 RX ADMIN — CARBOXYMETHYLCELLULOSE SODIUM 1 DROP: 10 GEL OPHTHALMIC at 20:21

## 2024-01-01 RX ADMIN — APIXABAN 10 MG: 5 TABLET, FILM COATED ORAL at 08:47

## 2024-01-01 RX ADMIN — ACETAMINOPHEN 650 MG: 325 TABLET ORAL at 05:55

## 2024-01-01 RX ADMIN — CARBOXYMETHYLCELLULOSE SODIUM 1 DROP: 10 GEL OPHTHALMIC at 08:47

## 2024-01-01 RX ADMIN — BUDESONIDE AND FORMOTEROL FUMARATE DIHYDRATE 2 PUFF: 160; 4.5 AEROSOL RESPIRATORY (INHALATION) at 19:40

## 2024-01-01 RX ADMIN — HEPARIN SODIUM 3400 UNITS: 1000 INJECTION INTRAVENOUS; SUBCUTANEOUS at 14:51

## 2024-01-01 RX ADMIN — CARBOXYMETHYLCELLULOSE SODIUM 1 DROP: 10 GEL OPHTHALMIC at 09:05

## 2024-01-01 RX ADMIN — HEPARIN SODIUM 1700 UNITS: 1000 INJECTION INTRAVENOUS; SUBCUTANEOUS at 21:54

## 2024-01-01 RX ADMIN — TIOTROPIUM BROMIDE INHALATION SPRAY 2 PUFF: 3.12 SPRAY, METERED RESPIRATORY (INHALATION) at 07:28

## 2024-01-01 RX ADMIN — BUDESONIDE AND FORMOTEROL FUMARATE DIHYDRATE 2 PUFF: 160; 4.5 AEROSOL RESPIRATORY (INHALATION) at 07:06

## 2024-01-01 RX ADMIN — BUDESONIDE AND FORMOTEROL FUMARATE DIHYDRATE 2 PUFF: 160; 4.5 AEROSOL RESPIRATORY (INHALATION) at 07:28

## 2024-01-01 RX ADMIN — DICLOFENAC SODIUM 4 G: 10 GEL TOPICAL at 09:11

## 2024-01-01 RX ADMIN — CARBOXYMETHYLCELLULOSE SODIUM 1 DROP: 10 GEL OPHTHALMIC at 16:06

## 2024-01-01 RX ADMIN — DICLOFENAC SODIUM 4 G: 10 GEL TOPICAL at 20:22

## 2024-01-01 RX ADMIN — Medication 10 ML: at 09:06

## 2024-01-01 RX ADMIN — AMLODIPINE BESYLATE 10 MG: 5 TABLET ORAL at 08:47

## 2024-01-01 ASSESSMENT — PAIN DESCRIPTION - DESCRIPTORS
DESCRIPTORS: DULL
DESCRIPTORS: DISCOMFORT
DESCRIPTORS: BURNING;THROBBING

## 2024-01-01 ASSESSMENT — PAIN SCALES - GENERAL
PAINLEVEL_OUTOF10: 10
PAINLEVEL_OUTOF10: 4
PAINLEVEL_OUTOF10: 0
PAINLEVEL_OUTOF10: 4
PAINLEVEL_OUTOF10: 1
PAINLEVEL_OUTOF10: 0
PAINLEVEL_OUTOF10: 2
PAINLEVEL_OUTOF10: 0

## 2024-01-01 ASSESSMENT — PAIN DESCRIPTION - ORIENTATION
ORIENTATION: RIGHT;LEFT
ORIENTATION: LEFT;RIGHT
ORIENTATION: LEFT;RIGHT

## 2024-01-01 ASSESSMENT — PAIN - FUNCTIONAL ASSESSMENT: PAIN_FUNCTIONAL_ASSESSMENT: NONE - DENIES PAIN

## 2024-01-01 ASSESSMENT — PAIN DESCRIPTION - LOCATION
LOCATION: ANKLE

## 2024-01-01 ASSESSMENT — LIFESTYLE VARIABLES
HOW MANY STANDARD DRINKS CONTAINING ALCOHOL DO YOU HAVE ON A TYPICAL DAY: PATIENT DOES NOT DRINK
HOW MANY STANDARD DRINKS CONTAINING ALCOHOL DO YOU HAVE ON A TYPICAL DAY: PATIENT DOES NOT DRINK
HOW OFTEN DO YOU HAVE A DRINK CONTAINING ALCOHOL: NEVER
HOW OFTEN DO YOU HAVE A DRINK CONTAINING ALCOHOL: NEVER

## 2024-01-24 RX ORDER — AMLODIPINE BESYLATE 10 MG/1
10 TABLET ORAL DAILY
Qty: 90 TABLET | Refills: 0 | Status: SHIPPED | OUTPATIENT
Start: 2024-01-24

## 2024-02-06 ENCOUNTER — OFFICE VISIT (OUTPATIENT)
Dept: PULMONOLOGY | Age: 89
End: 2024-02-06
Payer: MEDICARE

## 2024-02-06 VITALS
BODY MASS INDEX: 18.77 KG/M2 | HEART RATE: 102 BPM | HEIGHT: 60 IN | RESPIRATION RATE: 18 BRPM | WEIGHT: 95.6 LBS | SYSTOLIC BLOOD PRESSURE: 130 MMHG | DIASTOLIC BLOOD PRESSURE: 70 MMHG | OXYGEN SATURATION: 92 %

## 2024-02-06 DIAGNOSIS — R09.02 HYPOXIA: ICD-10-CM

## 2024-02-06 DIAGNOSIS — J47.9 BRONCHIECTASIS WITHOUT COMPLICATION (HCC): Primary | ICD-10-CM

## 2024-02-06 DIAGNOSIS — J44.9 COPD, SEVERE (HCC): ICD-10-CM

## 2024-02-06 PROCEDURE — 99214 OFFICE O/P EST MOD 30 MIN: CPT | Performed by: INTERNAL MEDICINE

## 2024-02-06 PROCEDURE — G8420 CALC BMI NORM PARAMETERS: HCPCS | Performed by: INTERNAL MEDICINE

## 2024-02-06 PROCEDURE — 1036F TOBACCO NON-USER: CPT | Performed by: INTERNAL MEDICINE

## 2024-02-06 PROCEDURE — 3023F SPIROM DOC REV: CPT | Performed by: INTERNAL MEDICINE

## 2024-02-06 PROCEDURE — 1123F ACP DISCUSS/DSCN MKR DOCD: CPT | Performed by: INTERNAL MEDICINE

## 2024-02-06 PROCEDURE — G8484 FLU IMMUNIZE NO ADMIN: HCPCS | Performed by: INTERNAL MEDICINE

## 2024-02-06 PROCEDURE — G8428 CUR MEDS NOT DOCUMENT: HCPCS | Performed by: INTERNAL MEDICINE

## 2024-02-06 RX ORDER — BUDESONIDE AND FORMOTEROL FUMARATE DIHYDRATE 160; 4.5 UG/1; UG/1
2 AEROSOL RESPIRATORY (INHALATION) 2 TIMES DAILY
Qty: 1 EACH | Refills: 5 | Status: SHIPPED | OUTPATIENT
Start: 2024-02-06

## 2024-02-06 RX ORDER — ALBUTEROL SULFATE 90 UG/1
2 AEROSOL, METERED RESPIRATORY (INHALATION) EVERY 6 HOURS PRN
Qty: 18 G | Refills: 5 | Status: SHIPPED | OUTPATIENT
Start: 2024-02-06

## 2024-02-06 NOTE — PROGRESS NOTES
lobe  Persistent RUL 9 mm nodule  Mild cardiomegaly  Coronary artery atherosclerosis    CT chest 9/11/2020    Mediastinum: Thyroid gland appears normal.  Small mediastinal and hilar nodes are noted.  Coronary artery calcification  is seen.  Small hiatal hernia seen.  There is nonspecific thickening at the  GE junction.  Lungs/pleura: Subpleural reticular opacities are seen throughout the lungs  bilaterally.  Scattered areas of subpleural honeycombing are seen, greatest  at the lung bases.  Focal nodular density in the right upper lobe is redemonstrated.  IMPRESSION:   Stable chest CT.  No obvious change in pulmonary fibrosis and dominant  irregular nodule in the right upper.        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 DDx IPF, hypersensitivity pneumonitis, NSIP, CTD-ILD and eosinophilic pneumonia.  Unremarkable connective tissue disease work-up with no peripheral eosinophilia.  Not candidate for further evaluation/management given age and comorbid conditions.  Supportive care is main course of treatment  RUL 1.5 x 0.7 cm nodule on CT 2/15/2020.  Stable on repeat CT 9/11/21.  DDx inflammatory, granuloma, and malignancy.  Patient/family not interested in further follow-up.  Not interested in cancer therapy if he is to have lung cancer.  Dyspnea -secondary to above.  Much improved with inhaled bronchodilators  [place] for 60 years with no respiratory protection  Lifelong non-smoker      Plan:       Continue Symbicort and Spiriva   Continue Albuterol 2 puffs Q4-6 hrs PRN  Refusing O2 still. Risks and benefits were discussed with patients    Patient/family still not interested in further follow-up/evaluation of pulmonary nodule.  Not interested in any cancer therapy if

## 2024-03-11 ENCOUNTER — OFFICE VISIT (OUTPATIENT)
Dept: INTERNAL MEDICINE CLINIC | Age: 89
End: 2024-03-11

## 2024-03-11 ENCOUNTER — HOSPITAL ENCOUNTER (OUTPATIENT)
Age: 89
Discharge: HOME | End: 2024-03-11
Payer: MEDICARE

## 2024-03-11 VITALS
HEIGHT: 60 IN | DIASTOLIC BLOOD PRESSURE: 78 MMHG | WEIGHT: 98 LBS | BODY MASS INDEX: 19.24 KG/M2 | OXYGEN SATURATION: 97 % | SYSTOLIC BLOOD PRESSURE: 138 MMHG | HEART RATE: 80 BPM

## 2024-03-11 DIAGNOSIS — I10 BENIGN ESSENTIAL HTN: ICD-10-CM

## 2024-03-11 DIAGNOSIS — J44.9 CHRONIC OBSTRUCTIVE PULMONARY DISEASE, UNSPECIFIED COPD TYPE (HCC): ICD-10-CM

## 2024-03-11 DIAGNOSIS — Z00.00 MEDICARE ANNUAL WELLNESS VISIT, SUBSEQUENT: Primary | ICD-10-CM

## 2024-03-11 DIAGNOSIS — Z00.00 ROUTINE GENERAL MEDICAL EXAMINATION AT A HEALTH CARE FACILITY: ICD-10-CM

## 2024-03-11 LAB
ANION GAP SERPL CALCULATED.3IONS-SCNC: 9 MMOL/L (ref 3–16)
BASOPHILS # BLD: 0.1 K/UL (ref 0–0.2)
BASOPHILS NFR BLD: 0.8 %
BUN SERPL-MCNC: 13 MG/DL (ref 7–20)
CALCIUM SERPL-MCNC: 8.8 MG/DL (ref 8.3–10.6)
CHLORIDE SERPL-SCNC: 109 MMOL/L (ref 99–110)
CO2 SERPL-SCNC: 22 MMOL/L (ref 21–32)
CREAT SERPL-MCNC: 0.8 MG/DL (ref 0.8–1.3)
DEPRECATED RDW RBC AUTO: 16.2 % (ref 12.4–15.4)
EOSINOPHIL # BLD: 0.1 K/UL (ref 0–0.6)
EOSINOPHIL NFR BLD: 1.5 %
GFR SERPLBLD CREATININE-BSD FMLA CKD-EPI: >60 ML/MIN/{1.73_M2}
GLUCOSE SERPL-MCNC: 88 MG/DL (ref 70–99)
HCT VFR BLD AUTO: 31.6 % (ref 40.5–52.5)
HGB BLD-MCNC: 10.8 G/DL (ref 13.5–17.5)
LYMPHOCYTES # BLD: 1.3 K/UL (ref 1–5.1)
LYMPHOCYTES NFR BLD: 18.1 %
MCH RBC QN AUTO: 32.2 PG (ref 26–34)
MCHC RBC AUTO-ENTMCNC: 34.3 G/DL (ref 31–36)
MCV RBC AUTO: 94 FL (ref 80–100)
MONOCYTES # BLD: 0.2 K/UL (ref 0–1.3)
MONOCYTES NFR BLD: 2.4 %
NEUTROPHILS # BLD: 5.5 K/UL (ref 1.7–7.7)
NEUTROPHILS NFR BLD: 77.2 %
PLATELET # BLD AUTO: 385 K/UL (ref 135–450)
PLATELET BLD QL SMEAR: ADEQUATE
PMV BLD AUTO: 8.6 FL (ref 5–10.5)
POTASSIUM SERPL-SCNC: 4.2 MMOL/L (ref 3.5–5.1)
RBC # BLD AUTO: 3.36 M/UL (ref 4.2–5.9)
SLIDE REVIEW: ABNORMAL
SODIUM SERPL-SCNC: 140 MMOL/L (ref 136–145)
WBC # BLD AUTO: 7.1 K/UL (ref 4–11)

## 2024-03-11 PROCEDURE — 36415 COLL VENOUS BLD VENIPUNCTURE: CPT

## 2024-03-11 PROCEDURE — G0439 PPPS, SUBSEQ VISIT: HCPCS | Performed by: INTERNAL MEDICINE

## 2024-03-11 PROCEDURE — 85025 COMPLETE CBC W/AUTO DIFF WBC: CPT

## 2024-03-11 PROCEDURE — 1123F ACP DISCUSS/DSCN MKR DOCD: CPT | Performed by: INTERNAL MEDICINE

## 2024-03-11 PROCEDURE — G8484 FLU IMMUNIZE NO ADMIN: HCPCS | Performed by: INTERNAL MEDICINE

## 2024-03-11 PROCEDURE — 80048 BASIC METABOLIC PNL TOTAL CA: CPT

## 2024-03-11 RX ORDER — AMLODIPINE BESYLATE 10 MG/1
10 TABLET ORAL DAILY
Qty: 90 TABLET | Refills: 1 | Status: SHIPPED | OUTPATIENT
Start: 2024-03-11

## 2024-03-11 ASSESSMENT — PATIENT HEALTH QUESTIONNAIRE - PHQ9
SUM OF ALL RESPONSES TO PHQ QUESTIONS 1-9: 0
SUM OF ALL RESPONSES TO PHQ QUESTIONS 1-9: 0
2. FEELING DOWN, DEPRESSED OR HOPELESS: 0
SUM OF ALL RESPONSES TO PHQ QUESTIONS 1-9: 0
SUM OF ALL RESPONSES TO PHQ QUESTIONS 1-9: 0
1. LITTLE INTEREST OR PLEASURE IN DOING THINGS: 0
SUM OF ALL RESPONSES TO PHQ9 QUESTIONS 1 & 2: 0

## 2024-03-11 ASSESSMENT — LIFESTYLE VARIABLES
HOW MANY STANDARD DRINKS CONTAINING ALCOHOL DO YOU HAVE ON A TYPICAL DAY: PATIENT DOES NOT DRINK
HOW OFTEN DO YOU HAVE A DRINK CONTAINING ALCOHOL: NEVER

## 2024-03-11 NOTE — PATIENT INSTRUCTIONS
candy, desserts, and soda pop.   Heart-healthy lifestyle    If your doctor recommends it, get more exercise. For many people, walking is a good choice. Or you may want to swim, bike, or do other activities. Bit by bit, increase the time you're active every day. Try for at least 30 minutes on most days of the week.     Try to quit or cut back on using tobacco and other nicotine products. This includes smoking and vaping. If you need help quitting, talk to your doctor about stop-smoking programs and medicines. These can increase your chances of quitting for good. Quitting is one of the most important things you can do to protect your heart. It is never too late to quit. Try to avoid secondhand smoke too.     Stay at a weight that's healthy for you. Talk to your doctor if you need help losing weight.     Try to get 7 to 9 hours of sleep each night.     Limit alcohol to 2 drinks a day for men and 1 drink a day for women. Too much alcohol can cause health problems.     Manage other health problems such as diabetes, high blood pressure, and high cholesterol. If you think you may have a problem with alcohol or drug use, talk to your doctor.   Medicines    Take your medicines exactly as prescribed. Call your doctor if you think you are having a problem with your medicine.     If your doctor recommends aspirin, take the amount directed each day. Make sure you take aspirin and not another kind of pain reliever, such as acetaminophen (Tylenol).   When should you call for help?   Call 911 if you have symptoms of a heart attack. These may include:    Chest pain or pressure, or a strange feeling in the chest.     Sweating.     Shortness of breath.     Pain, pressure, or a strange feeling in the back, neck, jaw, or upper belly or in one or both shoulders or arms.     Lightheadedness or sudden weakness.     A fast or irregular heartbeat.   After you call 911, the  may tell you to chew 1 adult-strength or 2 to 4 low-dose

## 2024-03-11 NOTE — PROGRESS NOTES
Medicare Annual Wellness Visit    Jh Joaquin is here for Medicare AWV    Assessment & Plan   Routine general medical examination at a health care facility  Chronic obstructive pulmonary disease, unspecified COPD type (HCC)  Benign essential HTN    Recommendations for Preventive Services Due: see orders and patient instructions/AVS.  Recommended screening schedule for the next 5-10 years is provided to the patient in written form: see Patient Instructions/AVS.     No follow-ups on file.     Subjective   The following acute and/or chronic problems were also addressed today:   Diagnosis Orders   1. Routine general medical examination at a health care facility        2. Chronic obstructive pulmonary disease, unspecified COPD type (HCC)        3. Benign essential HTN            AWV     HTN.  Stable.  Continue norvasc 10 mg daily     Anemia- likely iron deficiency.  Takes iron pills.  Resolved      bronchiectasis  Interstitial lung disease per CXR  Lung nodules- does not want any further work up  Sees pulmonologist  continue inhalers  97% on RA at rest      OA  Meloxicam 7.5 mg daily   Pepcid prn    DNR       Patient's complete Health Risk Assessment and screening values have been reviewed and are found in Flowsheets. The following problems were reviewed today and where indicated follow up appointments were made and/or referrals ordered.    Positive Risk Factor Screenings with Interventions:                 Dentist Screen:  Have you seen the dentist within the past year?: (!) No    Intervention:  No teeth      Vision Screen:  Do you have difficulty driving, watching TV, or doing any of your daily activities because of your eyesight?: No  Have you had an eye exam within the past year?: (!) No  No results found.    Interventions:   Patient encouraged to make appointment with their eye specialist    Safety:  Do you have non-slip mats or non-slip surfaces or shower bars or grab bars in your shower or bathtub?: (!)

## 2024-07-01 ENCOUNTER — TELEPHONE (OUTPATIENT)
Dept: INTERNAL MEDICINE CLINIC | Age: 89
End: 2024-07-01

## 2024-07-01 RX ORDER — FUROSEMIDE 20 MG/1
20 TABLET ORAL DAILY PRN
Qty: 10 TABLET | Refills: 0 | Status: SHIPPED | OUTPATIENT
Start: 2024-07-01

## 2024-07-01 NOTE — TELEPHONE ENCOUNTER
----- Message from Leonard Escoto MD sent at 7/1/2024  2:03 PM EDT -----  Contact: Telma [phone]  Ok to take   ----- Message -----  From: Ursula Harvey  Sent: 7/1/2024   1:05 PM EDT  To: Leonard Escoto MD    Gerardo interaction with meloxicam. Please advise.  ----- Message -----  From: Leonard Escoto MD  Sent: 7/1/2024  12:42 PM EDT  To: Zuly Kumar    Lasix 20 mg daily prn # 10   ----- Message -----  From: Monica Sanchez  Sent: 7/1/2024   8:45 AM EDT  To: Leonard Escoto MD    Caller patients daughter, states 3 days ago patients hands and face began to have some swelling with no pain. Caller asking if the patient needs to be on a water pill? Please advise     [facility]

## 2024-07-01 NOTE — TELEPHONE ENCOUNTER
----- Message from Leonard Escoto MD sent at 7/1/2024 12:41 PM EDT -----  Contact: Telma [phone]  Lasix 20 mg daily prn # 10   ----- Message -----  From: Monica Sanchez  Sent: 7/1/2024   8:45 AM EDT  To: Leonard Escoto MD    Caller patients daughter, states 3 days ago patients hands and face began to have some swelling with no pain. Caller asking if the patient needs to be on a water pill? Please advise     [facility]

## 2024-08-07 DIAGNOSIS — J44.9 COPD, SEVERE (HCC): ICD-10-CM

## 2024-08-07 RX ORDER — BUDESONIDE AND FORMOTEROL FUMARATE DIHYDRATE 160; 4.5 UG/1; UG/1
2 AEROSOL RESPIRATORY (INHALATION) 2 TIMES DAILY
Qty: 11 G | Refills: 5 | Status: SHIPPED | OUTPATIENT
Start: 2024-08-07

## 2024-08-08 RX ORDER — FUROSEMIDE 20 MG/1
20 TABLET ORAL DAILY
Qty: 90 TABLET | Refills: 0 | Status: SHIPPED | OUTPATIENT
Start: 2024-08-08

## 2024-08-08 NOTE — TELEPHONE ENCOUNTER
----- Message from Leonard Escoto MD sent at 8/8/2024  9:54 AM EDT -----  Contact: Telma [phone]  Lasix 20 mg daily # 90   ----- Message -----  From: Monica Sanchez  Sent: 8/8/2024   9:30 AM EDT  To: Leonard Escoto MD    Caller patients daughter, requesting a refill on furosemide (LASIX) 20 MG tablet. Caller asking if patient can start taking this one time a day everyday. Please advise     [facility]

## 2024-08-22 ENCOUNTER — OFFICE VISIT (OUTPATIENT)
Dept: PULMONOLOGY | Age: 89
End: 2024-08-22

## 2024-08-22 VITALS
WEIGHT: 98 LBS | BODY MASS INDEX: 19.24 KG/M2 | DIASTOLIC BLOOD PRESSURE: 60 MMHG | SYSTOLIC BLOOD PRESSURE: 150 MMHG | HEART RATE: 67 BPM | OXYGEN SATURATION: 95 % | HEIGHT: 60 IN

## 2024-08-22 DIAGNOSIS — J84.9 ILD (INTERSTITIAL LUNG DISEASE) (HCC): ICD-10-CM

## 2024-08-22 DIAGNOSIS — J47.9 BRONCHIECTASIS WITHOUT COMPLICATION (HCC): ICD-10-CM

## 2024-08-22 DIAGNOSIS — J44.9 COPD, SEVERE (HCC): Primary | ICD-10-CM

## 2024-08-22 DIAGNOSIS — R09.02 HYPOXIA: ICD-10-CM

## 2024-08-22 NOTE — PROGRESS NOTES
medical certainty, that patient has capacity to give or withhold consent to her medical care. Patient is aware of her medical illnesses, and understands the risks of not getting treatment including worsening physical condition and even death   Acapella and Mucinex   Follow-up in 6 months

## 2024-09-11 ENCOUNTER — OFFICE VISIT (OUTPATIENT)
Dept: INTERNAL MEDICINE CLINIC | Age: 89
End: 2024-09-11

## 2024-09-11 ENCOUNTER — HOSPITAL ENCOUNTER (OUTPATIENT)
Age: 89
Discharge: HOME | End: 2024-09-11
Payer: MEDICARE

## 2024-09-11 VITALS
DIASTOLIC BLOOD PRESSURE: 60 MMHG | WEIGHT: 102 LBS | HEIGHT: 60 IN | HEART RATE: 76 BPM | BODY MASS INDEX: 20.03 KG/M2 | SYSTOLIC BLOOD PRESSURE: 148 MMHG

## 2024-09-11 DIAGNOSIS — I10 BENIGN ESSENTIAL HTN: Primary | ICD-10-CM

## 2024-09-11 DIAGNOSIS — J44.9 CHRONIC OBSTRUCTIVE PULMONARY DISEASE, UNSPECIFIED COPD TYPE (HCC): ICD-10-CM

## 2024-09-11 DIAGNOSIS — Z23 NEED FOR INFLUENZA VACCINATION: ICD-10-CM

## 2024-09-11 DIAGNOSIS — I10 BENIGN ESSENTIAL HTN: ICD-10-CM

## 2024-09-11 PROBLEM — R06.09 DYSPNEA ON EXERTION: Status: RESOLVED | Noted: 2020-01-15 | Resolved: 2024-09-11

## 2024-09-11 LAB
ANION GAP SERPL CALCULATED.3IONS-SCNC: 11 MMOL/L (ref 3–16)
BUN SERPL-MCNC: 15 MG/DL (ref 7–20)
CALCIUM SERPL-MCNC: 8.6 MG/DL (ref 8.3–10.6)
CHLORIDE SERPL-SCNC: 108 MMOL/L (ref 99–110)
CO2 SERPL-SCNC: 23 MMOL/L (ref 21–32)
CREAT SERPL-MCNC: 0.9 MG/DL (ref 0.8–1.3)
GFR SERPLBLD CREATININE-BSD FMLA CKD-EPI: 79 ML/MIN/{1.73_M2}
GLUCOSE SERPL-MCNC: 81 MG/DL (ref 70–99)
POTASSIUM SERPL-SCNC: 4 MMOL/L (ref 3.5–5.1)
SODIUM SERPL-SCNC: 142 MMOL/L (ref 136–145)

## 2024-09-11 PROCEDURE — 90662 IIV NO PRSV INCREASED AG IM: CPT | Performed by: INTERNAL MEDICINE

## 2024-09-11 PROCEDURE — G0008 ADMIN INFLUENZA VIRUS VAC: HCPCS | Performed by: INTERNAL MEDICINE

## 2024-09-11 PROCEDURE — 99214 OFFICE O/P EST MOD 30 MIN: CPT | Performed by: INTERNAL MEDICINE

## 2024-09-11 PROCEDURE — 80048 BASIC METABOLIC PNL TOTAL CA: CPT

## 2024-09-11 PROCEDURE — 3023F SPIROM DOC REV: CPT | Performed by: INTERNAL MEDICINE

## 2024-09-11 PROCEDURE — G8427 DOCREV CUR MEDS BY ELIG CLIN: HCPCS | Performed by: INTERNAL MEDICINE

## 2024-09-11 PROCEDURE — G8420 CALC BMI NORM PARAMETERS: HCPCS | Performed by: INTERNAL MEDICINE

## 2024-09-11 PROCEDURE — 1123F ACP DISCUSS/DSCN MKR DOCD: CPT | Performed by: INTERNAL MEDICINE

## 2024-09-11 PROCEDURE — 1036F TOBACCO NON-USER: CPT | Performed by: INTERNAL MEDICINE

## 2024-09-11 ASSESSMENT — ENCOUNTER SYMPTOMS
COUGH: 0
NAUSEA: 0
CHEST TIGHTNESS: 0
SHORTNESS OF BREATH: 0
DIARRHEA: 0
BLOOD IN STOOL: 0
VOMITING: 0
ABDOMINAL PAIN: 0
WHEEZING: 0

## 2024-10-16 RX ORDER — AMLODIPINE BESYLATE 10 MG/1
10 TABLET ORAL DAILY
Qty: 90 TABLET | Refills: 1 | Status: SHIPPED | OUTPATIENT
Start: 2024-10-16

## 2024-11-04 RX ORDER — FUROSEMIDE 20 MG/1
20 TABLET ORAL DAILY
Qty: 90 TABLET | Refills: 1 | Status: SHIPPED | OUTPATIENT
Start: 2024-11-04

## 2024-12-19 ENCOUNTER — TELEPHONE (OUTPATIENT)
Dept: INTERNAL MEDICINE CLINIC | Age: 88
End: 2024-12-19

## 2024-12-19 NOTE — TELEPHONE ENCOUNTER
----- Message from Dr. Michael Kruse MD sent at 12/19/2024  1:37 PM EST -----  Contact: DARIANA [phone]  Continue same med  Arrange f/w Dr. LINTON  ----- Message -----  From: Viry Cannon  Sent: 12/19/2024  12:35 PM EST  To: Michael Kruse MD    Caller states the patient has currently been taking the below medication as he is experiencing swelling in his hands and face. Caller state the swelling does go down, but you can visibly see where the skin is retaining water in the patients hands and face. Caller is requesting your suggestion on what she should do to help reduce this issue. Please advise     furosemide (LASIX) 20 MG tablet    [facility] [address] [place] [address] - P [phone] - F [phone]  [address] [place] [id number]  Phone: [phone]  Fax: [phone]

## 2024-12-24 PROBLEM — R79.89 ELEVATED TROPONIN: Status: ACTIVE | Noted: 2024-01-01

## 2024-12-24 PROBLEM — I50.9 ACUTE CONGESTIVE HEART FAILURE, UNSPECIFIED HEART FAILURE TYPE (HCC): Status: ACTIVE | Noted: 2024-01-01

## 2024-12-24 PROBLEM — E87.6 HYPOKALEMIA: Status: ACTIVE | Noted: 2024-01-01

## 2024-12-24 PROBLEM — I26.99 ACUTE PULMONARY EMBOLISM WITHOUT ACUTE COR PULMONALE (HCC): Status: ACTIVE | Noted: 2024-01-01

## 2024-12-24 PROBLEM — J96.01 ACUTE RESPIRATORY FAILURE WITH HYPOXIA: Status: ACTIVE | Noted: 2024-01-01

## 2024-12-24 NOTE — PROGRESS NOTES
Consult has been called to Dr. Prado on 12/24/24. Spoke with Baljit. 4:09 PM    Tia Alford  12/24/2024

## 2024-12-24 NOTE — ED TRIAGE NOTES
Patient presents to the ED from home with c/o intermittent SOB, Increased extremity swelling. Patient denies any history of CHF. Denies chest pain. 75% on room air. Not normally on O2. Improved to 98% on 2L NC. Patient is alert and oriented x 4.

## 2024-12-24 NOTE — H&P
Hospital Medicine History & Physical      PCP: Leonard Escoto MD    Date of Admission: 12/24/2024    Date of Service: Pt seen/examined on 12/24/2024     Chief Complaint:    Chief Complaint   Patient presents with    Shortness of Breath       History Of Present Illness:      The patient is a 89 y.o. male with OA, hypertension, and anemia who presents to [facility] with c/o shortness of breath.  He appeared to be struggling to breathe today.  Worse with exertion.  Denies chest pain.  No fever or cough.  Ongoing for a while, but worsened in the last few weeks.  He is on Lasix for lower extremity swelling.      BP elevated.  Patient is tachypneic and hypoxic. Requiring 2 L O2.  Labs with hypokalemia, Lactic 2.1, BNP 40,028, troponin 132, mildly elevated LFT's, anemia at baseline.  Found to have PE, CHF.  Admitted for further workup/management.  Cardiology and pulmonology consulted.      Past Medical History:        Diagnosis Date    Anemia 10/2019    Hypertension     Osteoarthritis        Past Surgical History:    History reviewed. No pertinent surgical history.    Medications Prior to Admission:    Prior to Admission medications    Medication Sig Start Date End Date Taking? Authorizing Provider   furosemide (LASIX) 20 MG tablet Take 1 tablet by mouth once daily 11/4/24  Yes Leonard Escoto MD   amLODIPine (NORVASC) 10 MG tablet Take 1 tablet by mouth once daily 10/16/24  Yes Leonard Escoto MD   SYMBICORT 160-4.5 MCG/ACT AERO Inhale 2 puffs by mouth twice daily 8/7/24  Yes Ricardo Domingo MD   dextran 70-hypromellose (ARTIFICIAL TEARS) 0.1-0.3 % SOLN opthalmic solution Place 1 drop into both eyes 3 times daily 9/6/23  Yes Leonard Escoto MD   SPIRIVA RESPIMAT 2.5 MCG/ACT AERS inhaler INHALE 2 SPRAY(S) BY MOUTH ONCE DAILY 6/26/23  Yes Ricardo Domingo MD   diclofenac sodium (VOLTAREN) 1 % GEL Apply 4 g topically 4 times daily 12/7/22  Yes Leonard Esocto MD

## 2024-12-24 NOTE — PROGRESS NOTES
Consult has been called to Dr. Boone on 12/24/24. Spoke with Jeremiah. 4:12 PM    Tia Alford  12/24/2024

## 2024-12-24 NOTE — ED PROVIDER NOTES
[facility] ED  EMERGENCY DEPARTMENT ENCOUNTER        Pt Name: Jh Joaquin  MRN: [id number]  Birthdate 7/22/1930  Date of evaluation: 12/24/2024  Provider: JUAN Wilks - [facility]  PCP: Leonard Escoto MD  Note Started: 12:23 PM EST 12/24/24       I have seen and evaluated this patient with my supervising physician Elisha Kruse MD.      CHIEF COMPLAINT       Chief Complaint   Patient presents with    Shortness of Breath       HISTORY OF PRESENT ILLNESS: 1 or more Elements     History From: Patient, Patients family, Nursing staff     Limitations to history : Language [place]e. Used      Social Determinants Significantly Affecting Health : None    Chief Complaint: Shortness of breath    Jh Joaquin is a 89 y.o. male who presents to the emergency department today with symptoms of shortness of breath.  Patient reported that he has been feeling short of breath for several months.  States that over the last few weeks his symptoms have worsened particular with any exertion.  States that with exertion he has to stop and wants to lay down.  Shortly after he recovers and then feels better.  He denies any neck pain or back pain.  Denies any chest discomfort.  Abdominal pain or vomiting.  No fever.  He does report some lower extremity swelling's been an ongoing problem for him.  No known heart history.  Does have a history of COPD and follows with pulmonology.    Nursing Notes were all reviewed and agreed with or any disagreements were addressed in the HPI.    REVIEW OF SYSTEMS :      Review of Systems    Positives and Pertinent negatives as per HPI.     SURGICAL HISTORY   History reviewed. No pertinent surgical history.    CURRENTMEDICATIONS       Previous Medications    ALBUTEROL SULFATE HFA (VENTOLIN HFA) 108 (90 BASE) MCG/ACT INHALER    Inhale 2 puffs into the lungs every 6 hours as needed for Wheezing or Shortness of Breath    AMLODIPINE (NORVASC) 10 MG TABLET     Take 1 tablet by mouth once daily    DEXTRAN 70-HYPROMELLOSE (ARTIFICIAL TEARS) 0.1-0.3 % SOLN OPTHALMIC SOLUTION    Place 1 drop into both eyes every 4 hours as needed (eye irritation, dryness)    DEXTRAN 70-HYPROMELLOSE (ARTIFICIAL TEARS) 0.1-0.3 % SOLN OPTHALMIC SOLUTION    Place 1 drop into both eyes 3 times daily    DICLOFENAC SODIUM (VOLTAREN) 1 % GEL    Apply 4 g topically 4 times daily    [id number] AC (FERREX 28 PO)    Take by mouth    FUROSEMIDE (LASIX) 20 MG TABLET    Take 1 tablet by mouth once daily    SPIRIVA RESPIMAT 2.5 MCG/ACT AERS INHALER    INHALE 2 SPRAY(S) BY MOUTH ONCE DAILY    SYMBICORT 160-4.5 MCG/ACT AERO    Inhale 2 puffs by mouth twice daily       ALLERGIES     Patient has no known allergies.    FAMILYHISTORY     History reviewed. No pertinent family history.     SOCIAL HISTORY       Social History     Tobacco Use    Smoking status: Never    Smokeless tobacco: Never   Vaping Use    Vaping status: Never Used   Substance Use Topics    Alcohol use: Not Currently    Drug use: Never       SCREENINGS          [place] Coma Scale  Eye Opening: Spontaneous  Best Verbal Response: Oriented  Best Motor Response: Obeys commands  [place] Coma Scale Score: 15              CIWA Assessment  BP: (!) 158/75  Pulse: 83             PHYSICAL EXAM  1 or more Elements     ED Triage Vitals [12/24/24 1205]   BP Systolic BP Percentile Diastolic BP Percentile Temp Temp Source Pulse Respirations SpO2   (!) 189/71 -- -- 97.5 °F (36.4 °C) Oral 82 24 (!) 75 %      Height Weight - Scale         1.448 m (4' 9\") 43.1 kg (95 lb)             Physical Exam  Vitals and nursing note reviewed.   Constitutional:       Appearance: Normal appearance. He is underweight. He is ill-appearing. He is not toxic-appearing or diaphoretic.   HENT:      Head: Normocephalic and atraumatic.      Nose: Nose normal.   Eyes:      General:         Right eye: No discharge.         Left eye: No discharge.   Cardiovascular:      Rate  elevated 2.1 [ER]   1406 Repeat troponin stable at 132 [ER]   1418 CT PE: IMPRESSION:  1. Acute left pulmonary thromboembolus without right ventricular heart strain.  2. Small bilateral pleural effusions.  3. Nonspecific bilateral ground-glass opacities.  The differential diagnosis includes an infectious/inflammatory process, pulmonary edema or interstitial lung disease.  4. Chronic interstitial lung disease. [ER]   1427 The Ekg independently interpreted by me shows  sinus arrhythmia with a rate of 72  Axis is   Left axis deviation  QTc is  479  Intervals and Durations are unremarkable.      ST Segments: nonspecific changes  No significant change from prior EKG dated 1/15/20 [ER]   1506 Repeat lactate within normal limits [ER]      ED Course User Index  [ER] Elisha Kruse MD        Is this patient to be included in the SEP-1 Core Measure due to severe sepsis or septic shock?   No   Exclusion criteria - the patient is NOT to be included for SEP-1 Core Measure due to:  Infection is not suspected      Chronic Conditions affecting care:    has a past medical history of Anemia (10/2019), Hypertension, and Osteoarthritis.    CONSULTS: (Who and What was discussed)  IP CONSULT TO CARDIOLOGY  PHARMACY TO DOSE MEDICATION  Cardiology was consulted Dr. Jones and at this time plan for admission.  Symptoms likely due to CHF.  Recommend mission the [place].    Records Reviewed (External and Source)     CC/HPI Summary, DDx, ED Course, and Reassessment: Discussed CODE status with family and patient. DNR CCA .     Request admission for congestive heart failure, PE, hypoxia.  Patient presented here with symptoms of shortness of breath it has been ongoing for the last few months.  Seems of worsened over the last few days.  Patient states that he has exertional shortness of breath.  On exam he has rales and 3+ pitting edema in his lower extremities.  He has a BNP of 40,000 and a troponin of 133 with repeat of 132.  BUN was 21 with a

## 2024-12-24 NOTE — FLOWSHEET NOTE
12/24/24 1530   Vitals   Temp 97.8 °F (36.6 °C)   Pulse 79   Heart Rate Source Monitor   BP (!) 153/79   MAP (Calculated) 104   MAP (mmHg) 98   Oxygen Therapy   SpO2 98 %   O2 Device Nasal cannula   O2 Flow Rate (L/min) 2 L/min     Patient admitted to room 325 from ER. Patient oriented to room, call light, bed rails, phone, lights and bathroom. Patient instructed about the schedule of the day including: vital sign frequency, lab draws, possible tests, frequency of MD and staff rounds, daily weights, I &O's and prescribed diet.  Telemetry box in place, patient aware of placement and reason. Bed locked, in lowest position, side rails up 2/4, call light within reach.        Recliner Assessment  Patient is able to demonstrate the ability to move from a reclining position to an upright position within the recliner.       4 Eyes Skin Assessment     NAME:  Jh Joaquin  YOB: 1930  MEDICAL RECORD NUMBER:  [id number]    The patient is being assessed for  Admission    I agree that at least one RN has performed a thorough Head to Toe Skin Assessment on the patient. ALL assessment sites listed below have been assessed.      Areas assessed by both nurses:    Head, Face, Ears, Shoulders, Back, Chest, Arms, Elbows, Hands, Sacrum. Buttock, Coccyx, Ischium, and Legs. Feet and Heels    Scattered Bruising/Abrasion. Swelling noted in lower/upper extremity         Does the Patient have a Wound? No noted wound(s)       Rakesh Prevention initiated by RN: No  Wound Care Orders initiated by RN: No    Pressure Injury (Stage 3,4, Unstageable, DTI, NWPT, and Complex wounds) if present, place Wound referral order by RN under : No    New Ostomies, if present place, Ostomy referral order under : No     Nurse 1 eSignature: Electronically signed by Ursula Corado RN on 12/24/24 at 4:43 PM EST    **SHARE this note so that the co-signing nurse can place an eSignature**    Nurse 2 eSignature:

## 2024-12-24 NOTE — CONSULTS
Pharmacy will continue to monitor and manage High dose Heparin infusion protocol started in ED.  Continue as ordered.  Next labs due at 2100, Anti-Xa.  Phani Callaway RPH PharmD 12/24/2024 4:20 PM

## 2024-12-24 NOTE — ED PROVIDER NOTES
[facility] ED  EMERGENCY DEPARTMENT ENCOUNTER        Patient Name: Jh Joaquin  MRN: [id number]  Birthdate 7/22/1930  Date of evaluation: 12/24/2024  Provider: Elisha Kruse MD  PCP: Leonard Escoto MD  Note Started: 3:04 PM EST 12/24/24    I independently examined and evaluated Jh Joaquin. I personally saw the patient and performed a substantive portion of the visit including all aspects of the medical decision making.  I made/approved the management plan and take responsibility for the patient management.  I am the primary physician of record.    CHIEF COMPLAINT  Shortness of Breath       HISTORY OF PRESENT ILLNESS  History from : Patient, patient's daughter    Limitations to history : Language Ukrainian,  and patient's daughter helped translate    In brief, Jh Joaquin is a 89 y.o. male  has a past medical history of Anemia (10/2019), Hypertension, and Osteoarthritis., who presents to the ED complaining of shortness of breath.  Patient reports shortness of breath with exertion that has been going on for multiple days.  Denies chest pain.  He has reportedly been evaluated to potentially be placed on home oxygen in the past but declined.  He reports lower extremity edema.  Mild cough, no fever.       REVIEW OF SYSTEMS  All systems reviewed, pertinent positives per HPI otherwise noted to be negative.    Focused exam revealed   PHYSICAL EXAM  ED Triage Vitals [12/24/24 1205]   BP Systolic BP Percentile Diastolic BP Percentile Temp Temp Source Pulse Respirations SpO2   (!) 189/71 -- -- 97.5 °F (36.4 °C) Oral 82 24 (!) 75 %      Height Weight - Scale         1.448 m (4' 9\") 43.1 kg (95 lb)           GENERAL APPEARANCE: Awake and alert. Cooperative.   HENT: Normocephalic. Atraumatic. Mucous membranes are moist  NECK: Supple.  Full range of motion of the neck without stiffness or pain.  EYES: PERRL. EOM's grossly intact.  HEART/CHEST: RRR. No murmurs.   significantly elevated BNP at greater than 40,000.  Lasix initiated.  Also has elevated troponin, denies chest pain and EKG shows no acute changes, repeat stable.  Lower suspicion for ACS.  Patient also had elevated lactate that is down trended without intervention.  Patient also has hypokalemia, repletion given.  Patient has stable anemia and denies any bleeding sources.  Midlevel provider spoke to cardiology.  Patient cleared for to remain at [facility].  At this time, do feel the patient requires admission for further work-up and management.  Patient agrees to admission. Discussed the patient with hospital team, Dr Garrett, patient to be admitted to [facility].    Vitals:    Vitals:    12/24/24 1302 12/24/24 1403 12/24/24 1417 12/24/24 1500   BP: (!) 176/63 (!) 172/65 (!) 158/75 (!) 146/69   Pulse: 72 77 83 93   Resp: 16      Temp:       TempSrc:       SpO2: 99% 99% 99% 95%   Weight:       Height:           CRITICAL CARE TIME     I personally spent a total of 31 minutes of critical care time in obtaining history, performing a physical exam, bedside monitoring of interventions, collecting and interpreting tests and discussion with consultants but excluding time spent performing procedures, treating other patients and teaching time.                   FINAL IMPRESSION      1. Acute respiratory failure with hypoxia    2. Acute pulmonary embolism without acute cor pulmonale, unspecified pulmonary embolism type (HCC)    3. Acute pulmonary edema    4. Hypoxemia    5. Elevated troponin    6. Hypokalemia    7. Acute congestive heart failure, unspecified heart failure type (HCC)          DISPOSITION/PLAN   Disposition: DISPOSITION Admitted 12/24/2024 03:11:30 PM   DISPOSITION CONDITION Stable       DISCLAIMER: This chart was created using Dragon dictation software.  Efforts were made by me to ensure accuracy, however some errors may be present due to limitations of this technology and occasionally words are not  Special Stains Stage 1 - Results: Base On Clearance Noted Above

## 2024-12-24 NOTE — CONSULTS
[facility]                                 Pulmonary/Critical care  Consult /Progress Note :                                                                  CC :chest pain ,SOB    Patient is being seen at the request of Geovanna Deras for a consultation for PE    HISTORY OF PRESENT ILLNESS:     The patient is a 89 y.o. male with OA,with no smoking history and chronic edema , hypertension,   He who presents to [facility] with c/o shortness of breath.  He appeared to be struggling to breathe today.  Worse with exertion.  Denies chest pain.  No fever or cough.  His SOB is chronic  but get worse last 1-2 days  He is on Lasix for lower extremity swelling.       BP elevated.  Patient is tachypneic and hypoxic. Requiring 2 L O2.  Labs with hypokalemia, Lactic 2.1, BNP 40,028, troponin 132, mildly elevated LFT's, anemia at baseline.  Found to have PE, CHF.  Admitted for further workup/management.       PAST MEDICAL HISTORY:  Past Medical History:   Diagnosis Date    Anemia 10/2019    Hypertension     Osteoarthritis      PAST SURGICAL HISTORY:  History reviewed. No pertinent surgical history.    FAMILY HISTORY:  family history is not on file.    SOCIAL HISTORY:   reports that he has never smoked. He has never used smokeless tobacco.    Scheduled Meds:   amLODIPine  10 mg Oral Daily    carboxymethylcellulose PF  1 drop Both Eyes TID    diclofenac sodium  4 g Topical 4x Daily    [START ON 12/25/2024] tiotropium  2 puff Inhalation Daily RT    budesonide-formoterol  2 puff Inhalation BID RT    sodium chloride flush  5-40 mL IntraVENous 2 times per day    furosemide  40 mg IntraVENous BID     Continuous Infusions:   heparin (PORCINE) Infusion 18 Units/kg/hr (12/24/24 1453)    sodium chloride       PRN Meds:  heparin (porcine), heparin (porcine), albuterol sulfate HFA, carboxymethylcellulose PF, sodium chloride flush, sodium chloride,  \"WBCUA\", \"RBCUA\", \"MUCUS\", \"TRICHOMONAS\", \"YEAST\", \"BACTERIA\", \"CLARITYU\", \"SPECGRAV\", \"LEUKOCYTESUR\", \"UROBILINOGEN\", \"BILIRUBINUR\", \"BLOODU\", \"GLUCOSEU\", \"AMORPHOUS\" in the last 72 hours.    Invalid input(s): \"KETONESU\"    Microbiology:  Sent sputum ,ordered    Imaging  Chest imaging was reviewed by me and showed   1. Acute left pulmonary thromboembolus without right ventricular heart strain.  2. Small bilateral pleural effusions.  3. Nonspecific bilateral ground-glass opacities.  The differential diagnosis  includes an infectious/inflammatory process, pulmonary edema or interstitial  lung disease.  4. Chronic interstitial lung disease.  Findings were discussed with MARY LOPEZ at 1:57 pm on 12/24/2024.         ASSESSMENT:   Acute PE  Bronchiectasis with possible  UIP-IPF vs NISP(does not meet criteria for Uri Stern )  Asthma   Chronic hypoxic resp failure   RUL nodule followed as OP by Dr Domingo      PLAN:  Keep sat abocve 92  No clear etiology for his PE  Change elquis 10 mg bid X1 week then 5 mg bid ,assess duration as OP ,3-6 months or more based on evaluation,risks and benefits  Need O2  BD  2 d echo diuresis as needed  BNP  US LE r/o dvt   Discuss with daughter   Replace k as per IM

## 2024-12-24 NOTE — CONSULTS
High dose Heparin GTT  Once baseline aPTT has been collected begin protocol with a 3,400 unit IV bolus dose of heparin and begin the infusion at 18units/kg/hr (7.8mL/hr).  Check an anti-Xa 6hrs later.  Desired range is 0.3-0.7IU/mL.  Recorded weight is 43.1kg used for dosing.    anti-Xa < 0.1          Heparin 80 units/kg bolus (max 10,000 units)   Increase infusion by 4 units/kg/hr  anti-Xa 0.1-0.29     Heparin 40 units/kg bolus (max 5,000 units)     Increase infusion by 2 units/kg/hr  anti-Xa 0.3-0.7       No bolus                                                            No change   anti-Xa 0.71-0.8     No bolus                                                            Decrease infusion by 1 units/kg/hr  anti-Xa 0.81-0.99   No bolus                                                            Decrease infusion by 2 units/kg/hr  anti-Xa 1 or more   Hold heparin for 1 hour                                     Decrease infusion by 3 units/kg/hr    When Anti-Xa  is within target range for two consecutive times, check Anti-Xa once daily with morning labs.   Phani Callaway [facility] PharmD 12/24/2024 2:39 PM

## 2024-12-25 PROBLEM — J81.0 ACUTE PULMONARY EDEMA: Status: ACTIVE | Noted: 2024-01-01

## 2024-12-25 NOTE — FLOWSHEET NOTE
12/24/24 1922   Vital Signs   Temp 97.3 °F (36.3 °C)   Temp Source Oral   Pulse 73   Heart Rate Source Monitor   Respirations 16   BP (!) 128/58   MAP (Calculated) 81   BP Location Right upper arm   BP Method Automatic   Patient Position Semi fowlers   Oxygen Therapy   SpO2 100 %   O2 Device Nasal cannula   O2 Flow Rate (L/min) 2 L/min     PM Assessment completed. Scheduled medications given per MAR, On 2L of oxygen, A&O X4, Vital Signs completed and Charted, Patient denies any further needs at this time. Call light within reach, Reminded patient to call RN if he needs anything.

## 2024-12-25 NOTE — PROGRESS NOTES
Progress Note    Admit Date:  12/24/2024    Subjective:  Mr. Joaquin is a [place] patient.  Daughter is at bedside and speaks English.  She was used as .  Patient is admitted to hospital for shortness of breath.  He has known lung disease.  No history of prior heart disease.    He came to the ER for shortness of breath.  Workup showed an acute pulmonary embolism.  He was also concern for acute CHF.  He was admitted to hospital and placed on oxygen.  He was hypoxic in the ER.  He was tachypneic.  BNP was elevated.    This morning he is feeling better.  Oxygen level is being weaned down.  He is on IV heparin.  He denies any chest pain.  He is not having hemoptysis.    Objective:   BP (!) 160/53   Pulse 66   Temp 98.2 °F (36.8 °C) (Oral)   Resp 16   Ht 1.448 m (4' 9\")   Wt 43.8 kg (96 lb 8 oz)   SpO2 99% Comment: Simultaneous filing. User may not have seen previous data.  BMI 20.88 kg/m²        Intake/Output Summary (Last 24 hours) at 12/25/2024 1023  Last data filed at 12/25/2024 0856  Gross per 24 hour   Intake 120 ml   Output 1100 ml   Net -980 ml       Physical Exam:    General appearance: alert, appears stated age and cooperative  Head: Normocephalic, without obvious abnormality, atraumatic  Eyes: conjunctivae/corneas clear. PERRL, EOM's intact.  Neck: no adenopathy, no carotid bruit, no JVD, supple, symmetrical, trachea midline and thyroid not enlarged, symmetric, no tenderness/mass/nodules  Lungs: Mild accessory muscle use.Diminished breath sound the bases.  Bibasilar crackles.  No rhonchi.  Heart: regular rate and rhythm, S1, S2 normal, no murmur, click, rub or gallop  Abdomen: soft, non-tender; bowel sounds normal; no masses,  no organomegaly  Extremities: extremities normal, atraumatic, no cyanosis or edema  Pulses: 2+ and symmetric  Skin: Skin color, texture, turgor normal. No rashes or lesions  Neurologic: Grossly normal    Scheduled Meds:   apixaban  10 mg Oral BID    Followed by     [START ON 1/1/2025] apixaban  5 mg Oral BID    amLODIPine  10 mg Oral Daily    carboxymethylcellulose PF  1 drop Both Eyes TID    diclofenac sodium  4 g Topical 4x Daily    tiotropium  2 puff Inhalation Daily RT    budesonide-formoterol  2 puff Inhalation BID RT    sodium chloride flush  5-40 mL IntraVENous 2 times per day    [Held by provider] furosemide  40 mg IntraVENous BID       Continuous Infusions:   sodium chloride         PRN Meds:  heparin (porcine), heparin (porcine), albuterol sulfate HFA, carboxymethylcellulose PF, sodium chloride flush, sodium chloride, ondansetron **OR** ondansetron, polyethylene glycol, acetaminophen **OR** acetaminophen, perflutren lipid microspheres      Data:  CBC:   Recent Labs     12/24/24  1230 12/25/24  0411   WBC 9.1 8.3   HGB 10.8* 9.5*   HCT 32.3* 28.4*   MCV 95.1 95.4    273     BMP:   Recent Labs     12/24/24  1229 12/25/24  0411    143   K 3.1* 3.3*    108   CO2 22 27   BUN 21* 22*   CREATININE 1.1 1.1     LIVER PROFILE:   Recent Labs     12/24/24  1229 12/25/24  0411   AST 78* 36   ALT 21 12   BILITOT 0.9 0.4   ALKPHOS 160* 116     PT/INR: No results for input(s): \"PROTIME\", \"INR\" in the last 72 hours.  Cultures:   Results       Procedure Component Value Units Date/Time    COVID-19 & Influenza Combo [[id number]] Collected: 12/24/24 1451    Order Status: Completed Specimen: Nasopharyngeal Swab Updated: 12/24/24 1519     SARS-CoV-2 RNA, RT PCR NOT DETECTED     Comment: Not Detected results do not preclude SARS-CoV-2 infection and  should not be used as the sole basis for patient management  decisions.  Results must be combined with clinical observations,  patient history, and epidemiological information.  Testing was performed using [employer]AL[employer] MONTY SARS-CoV-2 and Influenza A/B  nucleic acid assay. This test is a multiplex Real-Time Reverse  Transcriptase Polymerase Chain Reaction (RT-PCR)-based in vitro  diagnostic test intended for the qualitative detection

## 2024-12-25 NOTE — PROGRESS NOTES
Shift assessment complete see flow sheet respirations easy and even. Patient denies any pain or needs at this time. Bed is locked in the lowest position with call light within reach.  Patients daughter is at bedside and has been updated on the plan of care with patients permission.

## 2024-12-25 NOTE — PROGRESS NOTES
Handoff report given to José Miguel OLSON.  Patient is in stable condition and has no needs at this time. Call light is in reach and bed is in the lowest position.  Care is transferred at this time.

## 2024-12-25 NOTE — PLAN OF CARE
HEART FAILURE CARE PLAN:    Comorbidities Reviewed: Yes   Patient has a past medical history of Anemia, Hypertension, and Osteoarthritis.     Weights Reviewed: Yes   Admission weight: 43.1 kg (95 lb)   Wt Readings from Last 3 Encounters:   12/24/24 43.1 kg (95 lb)   09/11/24 46.3 kg (102 lb)   08/22/24 44.5 kg (98 lb)     Intake & Output Reviewed: Yes     Intake/Output Summary (Last 24 hours) at 12/24/2024 2144  Last data filed at 12/24/2024 2020  Gross per 24 hour   Intake --   Output 500 ml   Net -500 ml       ECHOCARDIOGRAM Reviewed: Yes   Patient's Ejection Fraction (EF) is Not sure no ECHO in patients chart    Medications Reviewed: Yes   SCHEDULED HOSPITAL MEDICATIONS:   amLODIPine  10 mg Oral Daily    carboxymethylcellulose PF  1 drop Both Eyes TID    diclofenac sodium  4 g Topical 4x Daily    [START ON 12/25/2024] tiotropium  2 puff Inhalation Daily RT    budesonide-formoterol  2 puff Inhalation BID RT    sodium chloride flush  5-40 mL IntraVENous 2 times per day    furosemide  40 mg IntraVENous BID    heparin (porcine)  40 Units/kg IntraVENous Once     HOME MEDICATIONS:  Prior to Admission medications    Medication Sig Start Date End Date Taking? Authorizing Provider   furosemide (LASIX) 20 MG tablet Take 1 tablet by mouth once daily 11/4/24  Yes Leonard Escoto MD   amLODIPine (NORVASC) 10 MG tablet Take 1 tablet by mouth once daily 10/16/24  Yes Leonard Escoto MD   SYMBICORT 160-4.5 MCG/ACT AERO Inhale 2 puffs by mouth twice daily 8/7/24  Yes Ricardo Domingo MD   dextran 70-hypromellose (ARTIFICIAL TEARS) 0.1-0.3 % SOLN opthalmic solution Place 1 drop into both eyes 3 times daily 9/6/23  Yes Leonard Escoto MD   SPIRIVA RESPIMAT 2.5 MCG/ACT AERS inhaler INHALE 2 SPRAY(S) BY MOUTH ONCE DAILY 6/26/23  Yes Ricardo Domingo MD   diclofenac sodium (VOLTAREN) 1 % GEL Apply 4 g topically 4 times daily 12/7/22  Yes Leonard Escoto MD   dextran 70-hypromellose (ARTIFICIAL  TEARS) 0.1-0.3 % SOLN opthalmic solution Place 1 drop into both eyes every 4 hours as needed (eye irritation, dryness) 4/21/22  Yes Efren Avila MD   albuterol sulfate HFA (VENTOLIN HFA) 108 (90 Base) MCG/ACT inhaler Inhale 2 puffs into the lungs every 6 hours as needed for Wheezing or Shortness of Breath  Patient not taking: Reported on 12/24/2024 2/6/24   Ricardo Domingo MD   [id number]-Succ Ac (FERREX 28 PO) Take by mouth  Patient not taking: Reported on 12/24/2024    Provider, MD Kourtney      Diet Reviewed: Yes   ADULT DIET; Regular; No Added Salt (3-4 gm)    Goal of Care Reviewed: Yes   Patient and/or Family's stated Goal of Care this Admission: Reduce shortness of breath, increase activity tolerance, better understand heart failure and disease management, be more comfortable, and reduce lower extremity edema prior to discharge.     Electronically signed by Alexus Higgins RN on 12/24/2024 at 9:44 PM

## 2024-12-25 NOTE — PROGRESS NOTES
High dose Heparin GTT:  Anti-Xa at 2100 check is slightly below the desired range of 0.3-0.7IU/mL (resulted 0.27 IU/mL).  Give a 1,400 unit IV bolus dose and increase the infusion to 20units/kg/hr or 860 units/hr.  Recheck anti-Xa at 0400 12/25/24.  Phani Callaway RPH PharmD 12/24/2024 9:28 PM

## 2024-12-25 NOTE — CONSULTS
029801    SOB  PE - acute  CHF  Elevated BNP and trop  Hypoxia  COPD  Anemia  HTN  RBBB    Echo  AC per PE protocol   Continue duresis   Monitor labs for toxicity

## 2024-12-25 NOTE — PROGRESS NOTES
RT Inhaler-Nebulizer Bronchodilator Protocol Note    There is a bronchodilator order in the chart from a provider indicating to follow the RT Bronchodilator Protocol and there is an “Initiate RT Inhaler-Nebulizer Bronchodilator Protocol” order as well (see protocol at bottom of note).    CXR Findings:  XR CHEST PORTABLE    Result Date: 12/24/2024  In a patient with a known history of interstitial lung disease, multifocal ground-glass opacities are new since 2020 and may reflect progressive chronic change.  Superimposed pneumonitis cannot be excluded.       The findings from the last RT Protocol Assessment were as follows:   History Pulmonary Disease: None or smoker <15 pack years  Respiratory Pattern: Regular pattern and RR 12-20 bpm  Breath Sounds: Slightly diminished and/or crackles  Cough: Strong, spontaneous, non-productive  Indication for Bronchodilator Therapy: None  Bronchodilator Assessment Score: 2    Aerosolized bronchodilator medication orders have been revised according to the RT Inhaler-Nebulizer Bronchodilator Protocol below.    Respiratory Therapist to perform RT Therapy Protocol Assessment initially then follow the protocol.  Repeat RT Therapy Protocol Assessment PRN for score 0-3 or on second treatment, BID, and PRN for scores above 3.    No Indications - adjust the frequency to every 6 hours PRN wheezing or bronchospasm, if no treatments needed after 48 hours then discontinue using Per Protocol order mode.     If indication present, adjust the RT bronchodilator orders based on the Bronchodilator Assessment Score as indicated below.  Use Inhaler orders unless patient has one or more of the following: on home nebulizer, not able to hold breath for 10 seconds, is not alert and oriented, cannot activate and use MDI correctly, or respiratory rate 25 breaths per minute or more, then use the equivalent nebulizer order(s) with same Frequency and PRN reasons based on the score.  If a patient is on this  80.7

## 2024-12-25 NOTE — PROGRESS NOTES
12/25  Anti-Xa = 0.41 at 0411.  Continue heparin gtt at 20 units/kg/hr.  Recheck Anti-Xa in 6 hours.  Toney Coffman PharmD  12/25/2024 4:37 AM

## 2024-12-25 NOTE — PROGRESS NOTES
[facility] Pulmonary, Critical Care and Sleep Specialists                                 Pulmonary/Critical care  Consult /Progress Note :                                                                  CC :chest pain ,SOB      HPI  Still with SOB  Leg swelling   No chest Pain   On NC    PHYSICAL EXAM:  Vitals:    12/25/24 0728   BP: (!) 160/53   Pulse: 66   Resp: 16   Temp: 98.2 °F (36.8 °C)   SpO2: 99%     Gen: No distress.   Eyes: PERRL. No sclera icterus. No conjunctival injection.   ENT: No discharge. Pharynx clear.   Neck: Trachea midline. No obvious mass.    Resp: No accessory muscle use. No crackles. No wheezes. No rhonchi. No dullness on percussion.  CV: Regular rate. Regular rhythm. No murmur or rub. + 2 edema. Peripheral pulses are 2+.  Capillary refill is less than 3 seconds.  GI: Non-tender. Non-distended. No hernia.   Skin: Warm and dry. No nodule on exposed extremities.   Lymph: No cervical LAD. No supraclavicular LAD.   M/S: No cyanosis. No joint deformity. No clubbing.   Neuro: Awake. Alert. Moves all four extremities.   Psych: Oriented x 3. No anxiety.     LABS:  CBC:   Recent Labs     12/24/24  1230 12/25/24  0411   WBC 9.1 8.3   HGB 10.8* 9.5*   HCT 32.3* 28.4*   MCV 95.1 95.4    273     BMP:   Recent Labs     12/24/24  1229 12/25/24  0411    143   K 3.1* 3.3*    108   CO2 22 27   BUN 21* 22*   CREATININE 1.1 1.1     LIVER PROFILE:   Recent Labs     12/24/24  1229 12/25/24  0411   AST 78* 36   ALT 21 12   BILITOT 0.9 0.4   ALKPHOS 160* 116     PT/INR: No results for input(s): \"PROTIME\", \"INR\" in the last 72 hours.  APTT:   Recent Labs     12/24/24  1227   APTT 31.1     UA:No results for input(s): \"NITRITE\", \"COLORU\", \"PHUR\", \"LABCAST\", \"WBCUA\", \"RBCUA\", \"MUCUS\", \"TRICHOMONAS\", \"YEAST\", \"BACTERIA\", \"CLARITYU\", \"SPECGRAV\", \"LEUKOCYTESUR\", \"UROBILINOGEN\", \"BILIRUBINUR\", \"BLOODU\", \"GLUCOSEU\", \"AMORPHOUS\" in the last 72 hours.    Invalid  input(s): \"KETONESU\"    Microbiology:  Sent sputum ,ordered    Imaging  Chest imaging was reviewed by me and showed   1. Acute left pulmonary thromboembolus without right ventricular heart strain.  2. Small bilateral pleural effusions.  3. Nonspecific bilateral ground-glass opacities.  The differential diagnosis  includes an infectious/inflammatory process, pulmonary edema or interstitial  lung disease.  4. Chronic interstitial lung disease.  Findings were discussed with MARY LOPEZ at 1:57 pm on 12/24/2024.         ASSESSMENT:   Acute PE  Bronchiectasis with possible  UIP-IPF vs NISP(does not meet criteria for Uri Stern )  Asthma   Chronic hypoxic resp failure   RUL nodule followed as OP by Dr Domingo      PLAN:  Keep sat abocve 92  No clear etiology for his PE  Work up for CHF .cardioloy following   Change elquis 10 mg bid X1 week then 5 mg bid ,assess duration as OP ,3-6 months or more based on evaluation,risks and benefits  Need O2  BD  2 d echo diuresis as needed  BNP  US LE r/o dvt   Discuss with daughter   Replace shante as per IM

## 2024-12-25 NOTE — CONSULTS
[address] [id number]                              CONSULTATION      PATIENT NAME: LARISSA LANGSTON            : 1930  MED REC NO: [id number]                      ROOM: 325  ACCOUNT NO: [id number]                       ADMIT DATE: 2024  PROVIDER: Anshu Perkins MD      CONSULT DATE: 2024    REASON FOR CONSULTATION:  Shortness of breath.    HISTORY OF PRESENT ILLNESS:  89-year-old male presented to hospital with complaints of shortness of breath.  The patient is Brazilian-speaking,  is not available at this time and thus the history is obtained primarily from the chart and staff.  He presented to the emergency room with a chief complaint of shortness of breath that has been going on for several months, but worse over the last few weeks.  It is worse with any type of exertion.  No associated chest pain.  It is moderate in severity.  He has noticed some increased swelling in his legs as well.    PAST MEDICAL HISTORY:    1. Hypertension.  2. Anemia.    SOCIAL HISTORY:  He does not smoke.    FAMILY HISTORY:  Unable to obtain at this time.    REVIEW OF SYSTEMS:  Reported negative for fever, cough, vomiting, diarrhea, dysuria, rash, or anxiety.    ALLERGIES:  NO KNOWN DRUG ALLERGIES.      MEDICATIONS:  See list in chart which I have reviewed.    PHYSICAL EXAMINATION:  VITAL SIGNS:  Blood pressure is 149/58, heart rate 69, respirations 16, temperature 98.2, and he is 100% saturated on 2 L.    LABORATORY DATA AND IMAGING:  Outside medical records reviewed.  He has not had any documented cardiac evaluation or testing outside [facility].    [facility] objective data and imaging reviewed.  Pertinent findings include EKG shows sinus rhythm, right bundle-branch block, left axis deviation, left ventricular hypertrophy.  Chest x-ray; multifocal airspace disease. CT chest; acute

## 2024-12-26 NOTE — CARE COORDINATION
Case Management Assessment  Initial Evaluation    Date/Time of Evaluation: 12/26/2024 8:07 AM  Assessment Completed by: Scarlett Witt    If patient is discharged prior to next notation, then this note serves as note for discharge by case management.    Patient Name: Jh Joaquin                   YOB: 1930  Diagnosis: Hypoxemia [R09.02]  Hypokalemia [E87.6]  Acute pulmonary edema [J81.0]  Elevated troponin [R79.89]  Acute respiratory failure with hypoxia [J96.01]  Acute congestive heart failure, unspecified heart failure type (HCC) [I50.9]  Acute pulmonary embolism without acute cor pulmonale, unspecified pulmonary embolism type (HCC) [I26.99]                   Date / Time: 12/24/2024 12:03 PM    Patient Admission Status: Inpatient   Readmission Risk (Low < 19, Mod (19-27), High > 27): Readmission Risk Score: 14.7    Current PCP: Leonard Escoto MD  PCP verified by CM? Yes (Dr. LINTON)    Chart Reviewed: Yes      History Provided by: Child/Family  Patient Orientation: Alert and Oriented    Patient Cognition: Alert    Hospitalization in the last 30 days (Readmission):  No    If yes, Readmission Assessment in  Navigator will be completed.    Advance Directives:      Code Status: Limited   Patient's Primary Decision Maker is: Patient Declined (Legal Next of Kin Remains as Decision Maker)      Discharge Planning:    Patient lives with: Children Type of Home: House  Primary Care Giver: Family  Patient Support Systems include: Family Members, Children   Current Financial resources: Medicare  Current community resources: None  Current services prior to admission: Durable Medical Equipment            Current DME: Walker            Type of Home Care services:  None    ADLS  Prior functional level: Assistance with the following:, Bathing, Dressing, Cooking, Housework, Shopping, Mobility, Toileting  Current functional level: Toileting, Dressing, Bathing, Assistance with the following:, Cooking,

## 2024-12-26 NOTE — PROGRESS NOTES
Physician Progress Note      PATIENT:               LARISSA LANGSTON  [facility] #:                  [id number]  :                       1930  ADMIT DATE:       2024 12:03 PM  DISCH DATE:  RESPONDING  PROVIDER #:        JESUS Blankenship CNP        QUERY TEXT:    Type of Anemia: Please provide further specificity, if known.    Clinical indicators include: rbc, hemoglobin, hematocrit, platelets, platelet,   anemia, bleeding, hematuria, hgb, hct, hemoptysis, b12, nemia  Options provided:  -- Anemia due to acute blood loss  -- Anemia due to chronic blood loss  -- Anemia due to iron deficiency  -- Anemia due to postoperative blood loss  -- Anemia due to chronic disease  -- Other - I will add my own diagnosis  -- Disagree - Not applicable / Not valid  -- Disagree - Clinically Unable to determine / Unknown        PROVIDER RESPONSE TEXT:    The patient has anemia due to iron deficiency.      Electronically signed by:  JESUS Blankenship CNP 2024 3:03 PM

## 2024-12-26 NOTE — PROGRESS NOTES
O2 Sat at rest on room air is 86 %.  O2 Sat with activity on room air is 77 %.  O2 Sat at rest with oxygen @  2 lpm is 96%.  O2 Sat with activity with oxygen @ 2 lpm is 92%.

## 2024-12-26 NOTE — PROGRESS NOTES
[facility]   Progress Note  Cardiology    CC: SOB    HPI: breathing better. Appears comfortable. Spoke w/ daughter      Past Medical History   has a past medical history of Anemia, Hypertension, and Osteoarthritis.    Past Surgical History   has no past surgical history on file.    Social History   reports that he has never smoked. He has never used smokeless tobacco. He reports that he does not currently use alcohol. He reports that he does not use drugs.    Family History  family history is not on file.    Medications  Prior to Admission medications    Medication Sig Start Date End Date Taking? Authorizing Provider   furosemide (LASIX) 20 MG tablet Take 1 tablet by mouth once daily 11/4/24  Yes Leonard Escoto MD   amLODIPine (NORVASC) 10 MG tablet Take 1 tablet by mouth once daily 10/16/24  Yes Leonard Escoto MD   SYMBICORT 160-4.5 MCG/ACT AERO Inhale 2 puffs by mouth twice daily 8/7/24  Yes Ricardo Domingo MD   dextran 70-hypromellose (ARTIFICIAL TEARS) 0.1-0.3 % SOLN opthalmic solution Place 1 drop into both eyes 3 times daily 9/6/23  Yes Leonard Escoto MD   SPIRIVA RESPIMAT 2.5 MCG/ACT AERS inhaler INHALE 2 SPRAY(S) BY MOUTH ONCE DAILY 6/26/23  Yes Ricardo Domingo MD   diclofenac sodium (VOLTAREN) 1 % GEL Apply 4 g topically 4 times daily 12/7/22  Yes Leonard Escoto MD   dextran 70-hypromellose (ARTIFICIAL TEARS) 0.1-0.3 % SOLN opthalmic solution Place 1 drop into both eyes every 4 hours as needed (eye irritation, dryness) 4/21/22  Yes Efren Avila MD   albuterol sulfate HFA (VENTOLIN HFA) 108 (90 Base) MCG/ACT inhaler Inhale 2 puffs into the lungs every 6 hours as needed for Wheezing or Shortness of Breath  Patient not taking: Reported on 12/24/2024 2/6/24   Ricardo Domingo MD   [id number]-[id number]-G91-FA-C-Succ Ac (FERREX 28 PO) Take by mouth  Patient not taking: Reported on 12/24/2024    ProviderKourtney MD       Allergies  Patient has no known  allergies.    Review of Systems:   Reviewed. No changes except as noted in HPI and A/P      Lab Results   Component Value Date    WBC 8.2 12/26/2024    HGB 8.7 (L) 12/26/2024    HCT 26.1 (L) 12/26/2024    MCV 95.1 12/26/2024     12/26/2024     Lab Results   Component Value Date    CREATININE 1.0 12/26/2024    BUN 21 (H) 12/26/2024     12/26/2024    K 3.4 (L) 12/26/2024     12/26/2024    CO2 28 12/26/2024     No results found for: \"INR\", \"PROTIME\"    I reviewed EKGs and radiology imaging. Pertinent findings and changes as described in assessment below.      Physical Examination:    BP (!) 137/59   Pulse 66   Temp 97.7 °F (36.5 °C) (Oral)   Resp 18   Ht 1.448 m (4' 9\")   Wt 44.9 kg (99 lb)   SpO2 96%   BMI 21.42 kg/m²      General Appearance:  Alert, no distress, appears stated age   Head:  Normocephalic, without obvious abnormality, atraumatic   Eyes:  PERRL, conjunctiva/corneas clear         Nose: Nares normal, no drainage or sinus tenderness   Throat: Lips, mucosa, and tongue normal   Neck: Supple, symmetrical, trachea midline, no adenopathy         Lungs:   Rales to auscultation bilaterally    Chest Wall:  No tenderness or deformity   Heart:  Regular rate and rhythm, S1, S2 normal, no murmur, rub or gallop   Abdomen:   Soft, non tender, normal bowel sounds                Extremities: No cyanosis or edema   Pulses: 2+ and symmetric   Skin: Skin color, texture, turgor normal, no rashes    Pysch: Normal mood and affect   Neurologic: Normal gross motor and sensory exam.        Assessment:    SOB - multifactorial w/ PE, CHF, chronic lung dz  PE - acute  Acute diastolic CHF - improved  EF 50-55%  Moderate AI  Elev RVSP  Elevated trop due to type 2 MI, stable  Hypoxia  ILD  COPD  Anemia  HTN - stable  RBBB  Hypokalemia      Plan  AC per PE protocol   Continue duresis IV lasix and will change to oral at discharge. Likely 40 mg daily   Monitor labs for toxicity (K supp ordered)       Anshu SIMS

## 2024-12-26 NOTE — PLAN OF CARE
HEART FAILURE CARE PLAN:    Comorbidities Reviewed: Yes   Patient has a past medical history of Anemia, Hypertension, and Osteoarthritis.     Weights Reviewed: Yes   Admission weight: 43.1 kg (95 lb)   Wt Readings from Last 3 Encounters:   12/26/24 44.9 kg (99 lb)   09/11/24 46.3 kg (102 lb)   08/22/24 44.5 kg (98 lb)     Intake & Output Reviewed: Yes     Intake/Output Summary (Last 24 hours) at 12/26/2024 0857  Last data filed at 12/26/2024 0833  Gross per 24 hour   Intake 684 ml   Output 1700 ml   Net -1016 ml       ECHOCARDIOGRAM Reviewed: Yes   Patient's Ejection Fraction (EF) is pending     Medications Reviewed: Yes   SCHEDULED HOSPITAL MEDICATIONS:   apixaban  10 mg Oral BID    Followed by    [START ON 1/1/2025] apixaban  5 mg Oral BID    aspirin  81 mg Oral Daily    atorvastatin  40 mg Oral Nightly    amLODIPine  10 mg Oral Daily    carboxymethylcellulose PF  1 drop Both Eyes TID    diclofenac sodium  4 g Topical 4x Daily    tiotropium  2 puff Inhalation Daily RT    budesonide-formoterol  2 puff Inhalation BID RT    sodium chloride flush  5-40 mL IntraVENous 2 times per day    [Held by provider] furosemide  40 mg IntraVENous BID     HOME MEDICATIONS:  Prior to Admission medications    Medication Sig Start Date End Date Taking? Authorizing Provider   furosemide (LASIX) 20 MG tablet Take 1 tablet by mouth once daily 11/4/24  Yes Leonard Escoto MD   amLODIPine (NORVASC) 10 MG tablet Take 1 tablet by mouth once daily 10/16/24  Yes Leonard Escoto MD   SYMBICORT 160-4.5 MCG/ACT AERO Inhale 2 puffs by mouth twice daily 8/7/24  Yes Ricardo Domingo MD   dextran 70-hypromellose (ARTIFICIAL TEARS) 0.1-0.3 % SOLN opthalmic solution Place 1 drop into both eyes 3 times daily 9/6/23  Yes Leonard Escoto MD   SPIRIVA RESPIMAT 2.5 MCG/ACT AERS inhaler INHALE 2 SPRAY(S) BY MOUTH ONCE DAILY 6/26/23  Yes Ricardo Domingo MD   diclofenac sodium (VOLTAREN) 1 % GEL Apply 4 g topically 4 times daily  12/7/22  Yes Leonard Escoto MD   dextran 70-hypromellose (ARTIFICIAL TEARS) 0.1-0.3 % SOLN opthalmic solution Place 1 drop into both eyes every 4 hours as needed (eye irritation, dryness) 4/21/22  Yes Efren Avila MD   albuterol sulfate HFA (VENTOLIN HFA) 108 (90 Base) MCG/ACT inhaler Inhale 2 puffs into the lungs every 6 hours as needed for Wheezing or Shortness of Breath  Patient not taking: Reported on 12/24/2024 2/6/24   Ricardo Dominog MD   [id number]-[id number]-O14-FA-C-Succ Ac (FERREX 28 PO) Take by mouth  Patient not taking: Reported on 12/24/2024    Provider, MD Kourtney      Diet Reviewed: Yes   ADULT DIET; Regular; No Added Salt (3-4 gm)    Goal of Care Reviewed: Yes   Patient and/or Family's stated Goal of Care this Admission: Reduce shortness of breath, increase activity tolerance, better understand heart failure and disease management, be more comfortable, and reduce lower extremity edema prior to discharge.     Electronically signed by Taryn Mcqueen RN on 12/26/2024 at 8:57 AM        Problem: Chronic Conditions and Co-morbidities  Goal: Patient's chronic conditions and co-morbidity symptoms are monitored and maintained or improved  12/26/2024 0857 by Taryn Mcqueen RN  Outcome: Progressing  12/25/2024 2306 by Sarah Law RN  Outcome: Progressing     Problem: Discharge Planning  Goal: Discharge to home or other facility with appropriate resources  12/26/2024 0857 by Taryn Mcqueen RN  Outcome: Progressing  12/25/2024 2306 by Sarah Law RN  Outcome: Progressing     Problem: Safety - Adult  Goal: Free from fall injury  12/26/2024 0857 by Taryn Mcqueen RN  Outcome: Progressing  12/25/2024 2306 by Sarah Law RN  Outcome: Progressing     Problem: Pain  Goal: Verbalizes/displays adequate comfort level or baseline comfort level  12/26/2024 0857 by Taryn Mcqueen RN  Outcome: Progressing  12/25/2024 2306 by Sarah Law RN  Outcome: Progressing     Problem:

## 2024-12-26 NOTE — PROGRESS NOTES
[facility]   COPD PROGRAM      NAME:  Jh Joaquin  AGE: 89 y.o.   GENDER: male  : 1930  TODAY'S DATE:  2024    Subjective:     VISIT TYPE: Education    ADMIT DATE: 2024    PAST MEDICAL HISTORY:      Diagnosis Date    Anemia 10/2019    Hypertension     Osteoarthritis      HOME MEDICATIONS:  Prior to Admission medications    Medication Sig Start Date End Date Taking? Authorizing Provider   furosemide (LASIX) 40 MG tablet Take 1 tablet by mouth daily 24  Yes Bel Garrett MD   potassium chloride (KLOR-CON M) 10 MEQ extended release tablet Take 1 tablet by mouth daily 24  Yes Bel Garrett MD   atorvastatin (LIPITOR) 40 MG tablet Take 1 tablet by mouth nightly 24  Yes Bel Garrett MD   apixaban starter pack (ELIQUIS DVT/PE STARTER PACK) 5 MG TBPK tablet Take 1 tablet by mouth See Admin Instructions 24  Yes Bel Garrett MD   apixaban (ELIQUIS) 5 MG TABS tablet Take 1 tablet by mouth 2 times daily 24  Yes Bel Garrett MD   amLODIPine (NORVASC) 10 MG tablet Take 1 tablet by mouth once daily 10/16/24  Yes Leonard Escoto MD   SYMBICORT 160-4.5 MCG/ACT AERO Inhale 2 puffs by mouth twice daily 24  Yes Ricardo Domingo MD   dextran 70-hypromellose (ARTIFICIAL TEARS) 0.1-0.3 % SOLN opthalmic solution Place 1 drop into both eyes 3 times daily 23  Yes Leonard Escoto MD   SPIRIVA RESPIMAT 2.5 MCG/ACT AERS inhaler INHALE 2 SPRAY(S) BY MOUTH ONCE DAILY 23  Yes Ricardo Domingo MD   diclofenac sodium (VOLTAREN) 1 % GEL Apply 4 g topically 4 times daily 22  Yes Leonard Escoto MD   dextran 70-hypromellose (ARTIFICIAL TEARS) 0.1-0.3 % SOLN opthalmic solution Place 1 drop into both eyes every 4 hours as needed (eye irritation, dryness) 22  Yes Efren Avila MD   albuterol sulfate HFA (VENTOLIN HFA) 108 (90 Base) MCG/ACT inhaler Inhale 2 puffs into  COPD Action Plan  [x]  [employer] [facility]: [facility]   [x]  Smoking Cessation Classes  [x]  Magnet: Signs of COPD    PATIENT/CAREGIVER TEACHING:   Level of patient/caregiver understanding able to:   [x] Verbalize understanding   [] Demonstrate understanding       [] Teach back        [] Needs reinforcement     []  Other:      TEACHING TIME:  30 minutes       Recommendations:     [x]  Encourage to call COPD Nurse Resource Line [phone] with any questions or concerns.  [x]  Encourage follow-up appointment compliance. Next Appointment: 1/10/25 @ 0940am.   [x]  Continue to educate on signs and symptoms of COPD exacerbation.   [x]  Review COPD Action Plan Zones: Green, Yellow, Red.          Electronically signed by Lillian Gallego, MSN, RN  on 12/26/2024 at 12:43 PM

## 2024-12-26 NOTE — PROGRESS NOTES
O2 Sat at rest on Room Air is 86%    O2 Sat at rest with Oxygen at 2L/min is 77%  O2 Sat with Activity at 4L/min. Is 93%

## 2024-12-26 NOTE — FLOWSHEET NOTE
12/25/24 6724   Vital Signs   Temp 98.4 °F (36.9 °C)   Temp Source Oral   Pulse 70   Heart Rate Source Monitor   Respirations 18   BP (!) 138/51   MAP (Calculated) 80   Oxygen Therapy   SpO2 97 %   O2 Device Nasal cannula   O2 Flow Rate (L/min) 2 L/min     Resting quietly with respirations WNL on 2 L NC.  Call in easy reach.  Daughter at the bedside.  Bed alarm on for safety.  Continue to monitor closely.  Sarah Law RN

## 2024-12-26 NOTE — PROGRESS NOTES
[facility]                                 Pulmonary/Critical care  Consult /Progress Note :                                                                  CC :chest pain ,SOB      HPI  Still with SOB  Leg swelling better  No chest Pain   On NC    PHYSICAL EXAM:  Vitals:    12/26/24 0809   BP: (!) 137/59   Pulse:    Resp:    Temp:    SpO2:      Gen: No distress.   Eyes: PERRL. No sclera icterus. No conjunctival injection.   ENT: No discharge. Pharynx clear.   Neck: Trachea midline. No obvious mass.    Resp: No accessory muscle use. No crackles. No wheezes. No rhonchi. No dullness on percussion.  CV: Regular rate. Regular rhythm. No murmur or rub. + 2 edema. Peripheral pulses are 2+.  Capillary refill is less than 3 seconds.  GI: Non-tender. Non-distended. No hernia.   Skin: Warm and dry. No nodule on exposed extremities.   Lymph: No cervical LAD. No supraclavicular LAD.   M/S: No cyanosis. No joint deformity. No clubbing.   Neuro: Awake. Alert. Moves all four extremities.   Psych: Oriented x 3. No anxiety.     LABS:  CBC:   Recent Labs     12/24/24  1230 12/25/24  0411 12/26/24  0503   WBC 9.1 8.3 8.2   HGB 10.8* 9.5* 8.7*   HCT 32.3* 28.4* 26.1*   MCV 95.1 95.4 95.1    273 266     BMP:   Recent Labs     12/24/24  1229 12/25/24  0411 12/26/24  0503    143 142   K 3.1* 3.3* 3.4*    108 107   CO2 22 27 28   BUN 21* 22* 21*   CREATININE 1.1 1.1 1.0     LIVER PROFILE:   Recent Labs     12/24/24  1229 12/25/24  0411 12/26/24  0503   AST 78* 36 29   ALT 21 12 11   BILITOT 0.9 0.4 0.3   ALKPHOS 160* 116 113     PT/INR: No results for input(s): \"PROTIME\", \"INR\" in the last 72 hours.  APTT:   Recent Labs     12/24/24  1227   APTT 31.1     UA:No results for input(s): \"NITRITE\", \"COLORU\", \"PHUR\", \"LABCAST\", \"WBCUA\", \"RBCUA\", \"MUCUS\", \"TRICHOMONAS\", \"YEAST\", \"BACTERIA\", \"CLARITYU\", \"SPECGRAV\", \"LEUKOCYTESUR\",

## 2024-12-26 NOTE — FLOWSHEET NOTE
12/26/24 0730   Vital Signs   Temp 97.7 °F (36.5 °C)   Temp Source Oral   Pulse 66   Heart Rate Source Monitor   Respirations 18   BP (!) 137/48   MAP (Calculated) 78   BP Location Right upper arm   BP Method Automatic   Patient Position Semi fowlers     Assessment & vital signs completed. Morning meds given per MAR. Perfect serve sent to hospitalist re: K+ 3.4, awaiting additional orders. Pt denies any further needs at this time. Call light within reach, pt is stable.

## 2024-12-26 NOTE — DISCHARGE SUMMARY
Name:  Jh Joaquin  Room:  /0325-02  MRN:    [id number]    Discharge Summary      This discharge summary is in conjunction with a complete physical exam done on the day of discharge.    Attending Physician: Dr. Garrett  Discharging Physician: Dr. Garrett      Admit: 12/24/2024  Discharge:  12/26/2024    HPI:    The patient is a 89 y.o. male with OA, hypertension, and anemia who presents to [facility] with c/o shortness of breath.  He appeared to be struggling to breathe today.  Worse with exertion.  Denies chest pain.  No fever or cough.  Ongoing for a while, but worsened in the last few weeks.  He is on Lasix for lower extremity swelling.       BP elevated.  Patient is tachypneic and hypoxic. Requiring 2 L O2.  Labs with hypokalemia, Lactic 2.1, BNP 40,028, troponin 132, mildly elevated LFT's, anemia at baseline.  Found to have PE, CHF.  Admitted for further workup/management.  Cardiology and pulmonology consulted.       Diagnoses this Admission and Hospital Course   Acute Diastolic CHF  Severe pulmonary hypertension.   -BNP 40,028, troponin 132.   -admitted to PCU, telemetry  -cardiology consulted  -daily weights; I&O's.   -Got Lasix. Hold further doses for now.  -ordered Echo. Shows Grade II Diastolic, mild global hypokinesis, severe pulmonary hypertension.   -Mag, TSH WNL     Pulmonary embolus   -started on heparin drip, changed to PO Eliquis.   -pulmonology consulted.      Interstitial lung disease  COPD  -noted on CT  -pulmonology consulted  -continued home inhalers.   -albuterol prn     Acute hypoxic respiratory failure  -secondary to above  -does not wear oxygen at home  -initially tachypneic and hypoxic.  -requiring 2 L.  Wean as tolerated.   D/c home on Oxygen.  The need for this was discussed face to face with the patient.      Lactic acidosis   -resolved on repeat     Hypokalemia  -replaced. Monitored labs.      Elevated Alk phos, AST  -possibly related to CHF above  -trend LFT's    10:28 AM    LEUKOCYTESUR Negative 01/15/2020 10:28 AM    BLOODU Negative 01/15/2020 10:28 AM    GLUCOSEU Negative 01/15/2020 10:28 AM       CULTURES  Results       Procedure Component Value Units Date/Time    COVID-19 & Influenza Combo [[id number]] Collected: 12/24/24 1451    Order Status: Completed Specimen: Nasopharyngeal Swab Updated: 12/24/24 1517     SARS-CoV-2 RNA, RT PCR NOT DETECTED     Comment: Not Detected results do not preclude SARS-CoV-2 infection and  should not be used as the sole basis for patient management  decisions.  Results must be combined with clinical observations,  patient history, and epidemiological information.  Testing was performed using KALPA[employer] MONTY SARS-CoV-2 and Influenza A/B  nucleic acid assay. This test is a multiplex Real-Time Reverse  Transcriptase Polymerase Chain Reaction (RT-PCR)-based in vitro  diagnostic test intended for the qualitative detection of nucleic  acids from SARS-CoV-2, influenza A, and influenza B in nasopharyngeal  and nasal swab specimens for use under the FDA’s Emergency Use  Authorization (EUA) only.    Patient Fact Sheet:  https://www.fda.gov/media/873247/download  Provider Fact Sheet: https://www.fda.gov/media/835854/download  EUA: https://www.fda.gov/media/908772/download  IFU: https://www.fda.gov/media/384863/download    Methodology:  RT-PCR          Influenza A NOT DETECTED     Influenza B NOT DETECTED              RADIOLOGY  CT CHEST PULMONARY EMBOLISM W CONTRAST   Final Result   1. Acute left pulmonary thromboembolus without right ventricular heart strain.   2. Small bilateral pleural effusions.   3. Nonspecific bilateral ground-glass opacities.  The differential diagnosis   includes an infectious/inflammatory process, pulmonary edema or interstitial   lung disease.   4. Chronic interstitial lung disease.   Findings were discussed with MARY LOPEZ at 1:57 pm on 12/24/2024.         XR CHEST PORTABLE   Final Result   In a patient with a known history of  interstitial lung disease, multifocal   ground-glass opacities are new since 2020 and may reflect progressive chronic   change.  Superimposed pneumonitis cannot be excluded.           Echo 12/26/24      Left Ventricle: Low normal left ventricular systolic function with a visually estimated EF of 50 - 55%. Left ventricle is smaller than normal. Moderately increased wall thickness. Mild global hypokinesis present. Grade II diastolic dysfunction with increased LAP.    Aortic Valve: Trileaflet valve. Mildly thickened cusps. Moderate regurgitation.    Mitral Valve: Mild annular calcification. Mildly thickened, at the anterior and posterior leaflets. Moderate regurgitation.    Tricuspid Valve: Moderate regurgitation. Severely elevated RVSP, consistent with severe pulmonary hypertension. The estimated RVSP is 63 mmHg.    Left Atrium: Left atrium is severely dilated.    Pericardium: Trivial localized pericardial effusion present around the right ventricle. No indication of cardiac tamponade.    IVC/SVC: IVC diameter is less than or equal to 21 mm and decreases less than 50% during inspiration; therefore the estimated right atrial pressure is intermediate (~8 mmHg). IVC size is normal.    Image quality is adequate.        Discharge Medications     Medication List        START taking these medications      albuterol sulfate  (90 Base) MCG/ACT inhaler  Commonly known as: Ventolin HFA  Inhale 2 puffs into the lungs every 6 hours as needed for Wheezing or Shortness of Breath     * apixaban starter pack 5 MG Tbpk tablet  Commonly known as: Eliquis DVT/PE Starter Pack  Take 1 tablet by mouth See Admin Instructions     * apixaban 5 MG Tabs tablet  Commonly known as: Eliquis  Take 1 tablet by mouth 2 times daily     atorvastatin 40 MG tablet  Commonly known as: LIPITOR  Take 1 tablet by mouth nightly     potassium chloride 10 MEQ extended release tablet  Commonly known as: KLOR-CON M  Take 1 tablet by mouth daily           *

## 2024-12-26 NOTE — CONSULTS
[facility]   HEART FAILURE PROGRAM      NAME:  Jh Joaquin  AGE: 89 y.o.   GENDER: male  : 1930  TODAY'S DATE:  2024    Subjective:     VISIT TYPE: Education    ADMIT DATE: 2024    PAST MEDICAL HISTORY:      Diagnosis Date    Anemia 10/2019    Hypertension     Osteoarthritis      HOME MEDICATIONS:  Prior to Admission medications    Medication Sig Start Date End Date Taking? Authorizing Provider   furosemide (LASIX) 40 MG tablet Take 1 tablet by mouth daily 24  Yes Bel Garrett MD   potassium chloride (KLOR-CON M) 10 MEQ extended release tablet Take 1 tablet by mouth daily 24  Yes Bel Garrett MD   atorvastatin (LIPITOR) 40 MG tablet Take 1 tablet by mouth nightly 24  Yes Bel Garrett MD   apixaban starter pack (ELIQUIS DVT/PE STARTER PACK) 5 MG TBPK tablet Take 1 tablet by mouth See Admin Instructions 24  Yes Bel Garrett MD   apixaban (ELIQUIS) 5 MG TABS tablet Take 1 tablet by mouth 2 times daily 24  Yes Bel Garrett MD   amLODIPine (NORVASC) 10 MG tablet Take 1 tablet by mouth once daily 10/16/24  Yes Leonard Escoto MD   SYMBICORT 160-4.5 MCG/ACT AERO Inhale 2 puffs by mouth twice daily 24  Yes Ricardo Domingo MD   dextran 70-hypromellose (ARTIFICIAL TEARS) 0.1-0.3 % SOLN opthalmic solution Place 1 drop into both eyes 3 times daily 23  Yes Leonard Escoto MD   SPIRIVA RESPIMAT 2.5 MCG/ACT AERS inhaler INHALE 2 SPRAY(S) BY MOUTH ONCE DAILY 23  Yes Ricardo Domingo MD   diclofenac sodium (VOLTAREN) 1 % GEL Apply 4 g topically 4 times daily 22  Yes Leonard Escoto MD   dextran 70-hypromellose (ARTIFICIAL TEARS) 0.1-0.3 % SOLN opthalmic solution Place 1 drop into both eyes every 4 hours as needed (eye irritation, dryness) 22  Yes Efren Avila MD   albuterol sulfate HFA (VENTOLIN HFA) 108 (90 Base) MCG/ACT inhaler Inhale 2

## 2024-12-26 NOTE — PLAN OF CARE
HEART FAILURE CARE PLAN:    Comorbidities Reviewed: Yes   Patient has a past medical history of Anemia, Hypertension, and Osteoarthritis.     Weights Reviewed: Yes   Admission weight: 43.1 kg (95 lb)   Wt Readings from Last 3 Encounters:   12/25/24 43.8 kg (96 lb 8 oz)   09/11/24 46.3 kg (102 lb)   08/22/24 44.5 kg (98 lb)     Intake & Output Reviewed: Yes     Intake/Output Summary (Last 24 hours) at 12/25/2024 2300  Last data filed at 12/25/2024 2021  Gross per 24 hour   Intake 684 ml   Output 1700 ml   Net -1016 ml       ECHOCARDIOGRAM Reviewed: Yes   Patient's Ejection Fraction (EF) is unavailable, echo ordered    Medications Reviewed: Yes   SCHEDULED HOSPITAL MEDICATIONS:   apixaban  10 mg Oral BID    Followed by    [START ON 1/1/2025] apixaban  5 mg Oral BID    aspirin  81 mg Oral Daily    atorvastatin  40 mg Oral Nightly    amLODIPine  10 mg Oral Daily    carboxymethylcellulose PF  1 drop Both Eyes TID    diclofenac sodium  4 g Topical 4x Daily    tiotropium  2 puff Inhalation Daily RT    budesonide-formoterol  2 puff Inhalation BID RT    sodium chloride flush  5-40 mL IntraVENous 2 times per day    [Held by provider] furosemide  40 mg IntraVENous BID     HOME MEDICATIONS:  Prior to Admission medications    Medication Sig Start Date End Date Taking? Authorizing Provider   furosemide (LASIX) 20 MG tablet Take 1 tablet by mouth once daily 11/4/24  Yes Leonard Escoto MD   amLODIPine (NORVASC) 10 MG tablet Take 1 tablet by mouth once daily 10/16/24  Yes Leonard Escoto MD   SYMBICORT 160-4.5 MCG/ACT AERO Inhale 2 puffs by mouth twice daily 8/7/24  Yes Ricardo Domingo MD   dextran 70-hypromellose (ARTIFICIAL TEARS) 0.1-0.3 % SOLN opthalmic solution Place 1 drop into both eyes 3 times daily 9/6/23  Yes Leonard Escoto MD   SPIRIVA RESPIMAT 2.5 MCG/ACT AERS inhaler INHALE 2 SPRAY(S) BY MOUTH ONCE DAILY 6/26/23  Yes Ricardo Domingo MD   diclofenac sodium (VOLTAREN) 1 % GEL Apply 4 g  topically 4 times daily 12/7/22  Yes Leonard Escoto MD   dextran 70-hypromellose (ARTIFICIAL TEARS) 0.1-0.3 % SOLN opthalmic solution Place 1 drop into both eyes every 4 hours as needed (eye irritation, dryness) 4/21/22  Yes Efren Avila MD   albuterol sulfate HFA (VENTOLIN HFA) 108 (90 Base) MCG/ACT inhaler Inhale 2 puffs into the lungs every 6 hours as needed for Wheezing or Shortness of Breath  Patient not taking: Reported on 12/24/2024 2/6/24   Ricardo Domingo MD   [id number]-Succ Ac (FERREX 28 PO) Take by mouth  Patient not taking: Reported on 12/24/2024    Provider, MD Kourtney      Diet Reviewed: Yes   ADULT DIET; Regular; No Added Salt (3-4 gm)    Goal of Care Reviewed: Yes   Patient and/or Family's stated Goal of Care this Admission: Reduce shortness of breath, increase activity tolerance, better understand heart failure and disease management, be more comfortable, and reduce lower extremity edema prior to discharge.     Electronically signed by Sarah Law RN on 12/25/2024 at 11:00 PM

## 2024-12-28 NOTE — ED PROVIDER NOTES
[facility]  ED     EMERGENCY DEPARTMENT ENCOUNTER            Pt Name: Jh Joaquin   MRN: [id number]   Birthdate 7/22/1930   Date of evaluation: 12/28/2024   Provider: Clifford Johnson II, DO   PCP: Leonard Escoto MD   Note Started: 10:18 AM EST 12/28/24          CHIEF COMPLAINT     Chief Complaint   Patient presents with    Altered Mental Status     Patient from home, daughter called EMS upon arrival patient O2 sat 50's. Patient given solumedrol, IV and placed on NRB. 87% 15L             HISTORY OF PRESENT ILLNESS:   History from : Family daughters and EMS   Limitations to history : Altered Mental Status     Jh Joaquin is a 89 y.o. male who presents to the emergency department with altered mental status.  Patient has noted history of recent admission for acute respiratory failure secondary to PEs/pulmonary edema.  Patient is DNR/DNI.  He was noted to have decreased mental status at home and squad was called.  O2 sats in the 50s on their arrival.  Patient was started on nonrebreather and arrives into the emergency department with O2 sats of 87% on 15 L..  Of note, patient is Mongolian-speaking in addition to encephalopathic and unable to provide additional history.        Multiple family members available at bedside shortly after arrival.  They are in agreement that patient is DNR/DNI.  They wish for comfort care measures only at this time.          Nursing Notes were all reviewed and agreed with, or any disagreements were addressed in the HPI.     REVIEW OF SYSTEMS :    Positives and Pertinent negatives as per HPI.      MEDICAL HISTORY   has a past medical history of Anemia (10/2019), Hypertension, and Osteoarthritis.    No past surgical history on file.   CURRENTMEDICATIONS       Previous Medications    ALBUTEROL SULFATE HFA (VENTOLIN HFA) 108 (90 BASE) MCG/ACT INHALER    Inhale 2 puffs into the lungs every 6 hours as needed for Wheezing or Shortness of Breath    AMLODIPINE  Ativan and discontinuing oxygen.  Patient was comfortable throughout his stay and passed away as noted above.  PCP/ were notified.       Patient was given the following medications:   Medications   LORazepam (ATIVAN) injection 0.5 mg (0.5 mg IntraVENous Given 12/28/24 0838)        CONSULTS:   None   Discussion with Other Professionals: None   Social Determinants: None   Chronic Conditions: Respiratory failure/PE/CHF  Records Reviewed: NA      I am the Primary Clinician of Record.        FINAL IMPRESSION    1. Acute respiratory failure with hypoxia           DISPOSITION/PLAN:     [place] [facility]             (Please note that portions of this note were completed with a voice recognition program.  Efforts were made to edit the dictations but occasionally words are mis-transcribed.)       Clifford Johnson II, DO (electronically signed)             Clifford Johnson II, DO  12/28/24 8053

## 2024-12-28 NOTE — ED NOTES
As per Dr. Johnson patient will be put on comfort care as of this time, monitor was put on comfort care adjustment by ED tech. Dr. Johnson removed his non rebreather mask, family is aware of what is happening and they are on bedside with patient.

## 2024-12-28 NOTE — ED NOTES
and family at bedside. Bereavement cart ordered. Coffee and chairs given to family. No other needs at this time. Provided privacy to family and pt.

## 2024-12-28 NOTE — ED NOTES
Acute on chronic respiratory failure 2/2 PE and pulmonary edema  @1150 Called Hospice per Dr.Lee Hahn over face sheet and someone should be over here soon

## 2025-01-06 NOTE — PROGRESS NOTES
Physician Progress Note      PATIENT:               LARISSA LANGSTON  [facility] #:                  [id number]  :                       1930  ADMIT DATE:       2024 12:03 PM  DISCH DATE:        2024 3:05 PM  RESPONDING  PROVIDER #:        Anshu Perkins MD          QUERY TEXT:    Patient admitted with SOB .  Noted documentation of type II MI in  PN .   In order to support the diagnosis of type II MI, please refer to 4th universal   definition of MI below and include additional clinical indicators in your   documentation.  Or please document if the diagnosis of type II MI has been   ruled out after study.  ?      The medical record reflects the following:  Risk Factors: COPD, ILD, RBBB, HTN, Acute PE  Clinical Indicators: PN : Elevated troponin due to acute illness.  I do   not think this represents an acute coronary syndrome.  The patient does not   have chest pain or acute ischemia or injury on his EKG. PN : Elevated   trop due to type 2 MI, stable-The patient does not have chest pain or acute   ischemia or injury on his EKG.  Treatment: AC per PE protocol Continue duresis IV lasix and will change to   oral at discharge. Likely 40 mg daily, ECHO, Cards consult, CXR, Oxygen    Fourth Universal Definition of Myocardial Infarction:  Clearly separates MI   from myocardial injury. Patients with elevated blood troponin levels but   without clinical evidence of ischemia are said to have had a myocardial   injury.? To have a myocardial infarction requires both an elevated troponin   blood test along with at least one of the following:  - Symptoms of acute myocardial ischemia (Types 1 - 5 MI)  - Clinical evidence of ischemia, as evidenced in an electrocardiogram (EKG)   showing new ischemic changes (Type 1, Type 2, Type 3, or Type 4a MI)  - Development of pathological Q waves (Types 1 - 5 MI)  - Imaging evidence of new loss of viable myocardium or new regional wall   motion abnormality in

## 2025-01-07 NOTE — PROGRESS NOTES
Physician Progress Note      PATIENT:               LARISSA LANGSTON  [facility] #:                  [id number]  :                       1930  ADMIT DATE:       2024 12:03 PM  DISCH DATE:        2024 3:05 PM  RESPONDING  PROVIDER #:        Anshu Perkins MD          QUERY TEXT:    Pt admitted with SOB and has CHF documented. If possible, please document in   progress notes and discharge summary further specificity regarding the type   and acuity of CHF:    The medical record reflects the following:  Risk Factors: Acute PE, HTN, COPD, ILD, RBBB  Clinical Indicators: PN : Elevated Alk phos, AST-possibly related to CHF,    Acute hypoxic respiratory failure, acute CHF await ECHO-BNP 40,028, troponin   132. ECHO-Low normal left ventricular systolic function with a visually   estimated EF of 50 - 55%.Left ventricle is smaller than normal. Moderately   increased wall thickness. Mild global hypokinesis present. Grade II diastolic   dysfunction with increased LAP.-Severely elevated RVSP, consistent with severe   pulmonary hypertension. The estimated RVSP is 63 mmHg.-Right ventricle size   is normal. Normal systolic function.-Right a  Treatment: IV Lasix, Oxygen via NC, ECHO, CXR, CT chest, Labs, Cards consult,   daily weights; I&O's.  Options provided:  -- Acute on Chronic Diastolic CHF/HFpEF  -- Acute Diastolic CHF/HFpEF  -- Other - I will add my own diagnosis  -- Disagree - Not applicable / Not valid  -- Disagree - Clinically unable to determine / Unknown  -- Refer to Clinical Documentation Reviewer    PROVIDER RESPONSE TEXT:    This patient is in acute diastolic CHF/HFpEF.    Query created by: Perez Neri on 2024 2:30 PM      Electronically signed by:  Anshu Perkins MD 2025 1:17 PM